# Patient Record
Sex: MALE | Race: OTHER | HISPANIC OR LATINO | URBAN - METROPOLITAN AREA
[De-identification: names, ages, dates, MRNs, and addresses within clinical notes are randomized per-mention and may not be internally consistent; named-entity substitution may affect disease eponyms.]

---

## 2020-05-20 ENCOUNTER — EMERGENCY (EMERGENCY)
Facility: HOSPITAL | Age: 70
LOS: 1 days | Discharge: HOME | End: 2020-05-20
Attending: EMERGENCY MEDICINE
Payer: COMMERCIAL

## 2020-05-20 VITALS
HEIGHT: 71 IN | TEMPERATURE: 99 F | DIASTOLIC BLOOD PRESSURE: 88 MMHG | HEART RATE: 88 BPM | SYSTOLIC BLOOD PRESSURE: 147 MMHG | WEIGHT: 160.06 LBS | OXYGEN SATURATION: 98 % | RESPIRATION RATE: 18 BRPM

## 2020-05-20 VITALS
OXYGEN SATURATION: 100 % | HEART RATE: 69 BPM | TEMPERATURE: 98 F | DIASTOLIC BLOOD PRESSURE: 44 MMHG | SYSTOLIC BLOOD PRESSURE: 159 MMHG | RESPIRATION RATE: 18 BRPM

## 2020-05-20 LAB
ALBUMIN SERPL ELPH-MCNC: 4.1 G/DL — SIGNIFICANT CHANGE UP (ref 3.5–5)
ALP SERPL-CCNC: 84 U/L — SIGNIFICANT CHANGE UP (ref 40–120)
ALT FLD-CCNC: 23 U/L DA — SIGNIFICANT CHANGE UP (ref 10–60)
ANION GAP SERPL CALC-SCNC: 8 MMOL/L — SIGNIFICANT CHANGE UP (ref 5–17)
APPEARANCE UR: CLEAR — SIGNIFICANT CHANGE UP
AST SERPL-CCNC: 25 U/L — SIGNIFICANT CHANGE UP (ref 10–40)
BASOPHILS # BLD AUTO: 0.04 K/UL — SIGNIFICANT CHANGE UP (ref 0–0.2)
BASOPHILS NFR BLD AUTO: 0.4 % — SIGNIFICANT CHANGE UP (ref 0–2)
BILIRUB SERPL-MCNC: 0.6 MG/DL — SIGNIFICANT CHANGE UP (ref 0.2–1.2)
BILIRUB UR-MCNC: NEGATIVE — SIGNIFICANT CHANGE UP
BUN SERPL-MCNC: 17 MG/DL — SIGNIFICANT CHANGE UP (ref 7–18)
CALCIUM SERPL-MCNC: 9.1 MG/DL — SIGNIFICANT CHANGE UP (ref 8.4–10.5)
CHLORIDE SERPL-SCNC: 104 MMOL/L — SIGNIFICANT CHANGE UP (ref 96–108)
CO2 SERPL-SCNC: 28 MMOL/L — SIGNIFICANT CHANGE UP (ref 22–31)
COLOR SPEC: YELLOW — SIGNIFICANT CHANGE UP
CREAT SERPL-MCNC: 1.32 MG/DL — HIGH (ref 0.5–1.3)
DIFF PNL FLD: ABNORMAL
EOSINOPHIL # BLD AUTO: 0.06 K/UL — SIGNIFICANT CHANGE UP (ref 0–0.5)
EOSINOPHIL NFR BLD AUTO: 0.6 % — SIGNIFICANT CHANGE UP (ref 0–6)
GLUCOSE SERPL-MCNC: 106 MG/DL — HIGH (ref 70–99)
GLUCOSE UR QL: NEGATIVE — SIGNIFICANT CHANGE UP
HCT VFR BLD CALC: 44.7 % — SIGNIFICANT CHANGE UP (ref 39–50)
HGB BLD-MCNC: 14.8 G/DL — SIGNIFICANT CHANGE UP (ref 13–17)
IMM GRANULOCYTES NFR BLD AUTO: 0.3 % — SIGNIFICANT CHANGE UP (ref 0–1.5)
KETONES UR-MCNC: NEGATIVE — SIGNIFICANT CHANGE UP
LEUKOCYTE ESTERASE UR-ACNC: NEGATIVE — SIGNIFICANT CHANGE UP
LIDOCAIN IGE QN: 163 U/L — SIGNIFICANT CHANGE UP (ref 73–393)
LYMPHOCYTES # BLD AUTO: 1.11 K/UL — SIGNIFICANT CHANGE UP (ref 1–3.3)
LYMPHOCYTES # BLD AUTO: 11 % — LOW (ref 13–44)
MCHC RBC-ENTMCNC: 31.9 PG — SIGNIFICANT CHANGE UP (ref 27–34)
MCHC RBC-ENTMCNC: 33.1 GM/DL — SIGNIFICANT CHANGE UP (ref 32–36)
MCV RBC AUTO: 96.3 FL — SIGNIFICANT CHANGE UP (ref 80–100)
MONOCYTES # BLD AUTO: 0.78 K/UL — SIGNIFICANT CHANGE UP (ref 0–0.9)
MONOCYTES NFR BLD AUTO: 7.7 % — SIGNIFICANT CHANGE UP (ref 2–14)
NEUTROPHILS # BLD AUTO: 8.09 K/UL — HIGH (ref 1.8–7.4)
NEUTROPHILS NFR BLD AUTO: 80 % — HIGH (ref 43–77)
NITRITE UR-MCNC: NEGATIVE — SIGNIFICANT CHANGE UP
NRBC # BLD: 0 /100 WBCS — SIGNIFICANT CHANGE UP (ref 0–0)
PH UR: 6 — SIGNIFICANT CHANGE UP (ref 5–8)
PLATELET # BLD AUTO: 244 K/UL — SIGNIFICANT CHANGE UP (ref 150–400)
POTASSIUM SERPL-MCNC: 4.1 MMOL/L — SIGNIFICANT CHANGE UP (ref 3.5–5.3)
POTASSIUM SERPL-SCNC: 4.1 MMOL/L — SIGNIFICANT CHANGE UP (ref 3.5–5.3)
PROT SERPL-MCNC: 7.6 G/DL — SIGNIFICANT CHANGE UP (ref 6–8.3)
PROT UR-MCNC: 15
RBC # BLD: 4.64 M/UL — SIGNIFICANT CHANGE UP (ref 4.2–5.8)
RBC # FLD: 13.3 % — SIGNIFICANT CHANGE UP (ref 10.3–14.5)
SODIUM SERPL-SCNC: 140 MMOL/L — SIGNIFICANT CHANGE UP (ref 135–145)
SP GR SPEC: 1.02 — SIGNIFICANT CHANGE UP (ref 1.01–1.02)
TROPONIN I SERPL-MCNC: <0.015 NG/ML — SIGNIFICANT CHANGE UP (ref 0–0.04)
UROBILINOGEN FLD QL: NEGATIVE — SIGNIFICANT CHANGE UP
WBC # BLD: 10.11 K/UL — SIGNIFICANT CHANGE UP (ref 3.8–10.5)
WBC # FLD AUTO: 10.11 K/UL — SIGNIFICANT CHANGE UP (ref 3.8–10.5)

## 2020-05-20 PROCEDURE — 85027 COMPLETE CBC AUTOMATED: CPT

## 2020-05-20 PROCEDURE — 99285 EMERGENCY DEPT VISIT HI MDM: CPT | Mod: 25

## 2020-05-20 PROCEDURE — 81001 URINALYSIS AUTO W/SCOPE: CPT

## 2020-05-20 PROCEDURE — 80053 COMPREHEN METABOLIC PANEL: CPT

## 2020-05-20 PROCEDURE — 96374 THER/PROPH/DIAG INJ IV PUSH: CPT | Mod: XU

## 2020-05-20 PROCEDURE — 83690 ASSAY OF LIPASE: CPT

## 2020-05-20 PROCEDURE — 87086 URINE CULTURE/COLONY COUNT: CPT

## 2020-05-20 PROCEDURE — 74177 CT ABD & PELVIS W/CONTRAST: CPT

## 2020-05-20 PROCEDURE — 84484 ASSAY OF TROPONIN QUANT: CPT

## 2020-05-20 PROCEDURE — 74177 CT ABD & PELVIS W/CONTRAST: CPT | Mod: 26

## 2020-05-20 PROCEDURE — 99285 EMERGENCY DEPT VISIT HI MDM: CPT

## 2020-05-20 RX ORDER — SODIUM CHLORIDE 9 MG/ML
1000 INJECTION INTRAMUSCULAR; INTRAVENOUS; SUBCUTANEOUS ONCE
Refills: 0 | Status: COMPLETED | OUTPATIENT
Start: 2020-05-20 | End: 2020-05-20

## 2020-05-20 RX ORDER — TAMSULOSIN HYDROCHLORIDE 0.4 MG/1
1 CAPSULE ORAL
Qty: 0 | Refills: 0 | DISCHARGE
Start: 2020-05-20 | End: 2020-06-02

## 2020-05-20 RX ORDER — IBUPROFEN 200 MG
1 TABLET ORAL
Qty: 30 | Refills: 0
Start: 2020-05-20

## 2020-05-20 RX ORDER — KETOROLAC TROMETHAMINE 30 MG/ML
30 SYRINGE (ML) INJECTION ONCE
Refills: 0 | Status: DISCONTINUED | OUTPATIENT
Start: 2020-05-20 | End: 2020-05-20

## 2020-05-20 RX ORDER — CEPHALEXIN 500 MG
1 CAPSULE ORAL
Qty: 14 | Refills: 0
Start: 2020-05-20 | End: 2020-05-26

## 2020-05-20 RX ORDER — TAMSULOSIN HYDROCHLORIDE 0.4 MG/1
1 CAPSULE ORAL
Qty: 14 | Refills: 0
Start: 2020-05-20 | End: 2020-06-02

## 2020-05-20 RX ADMIN — SODIUM CHLORIDE 1000 MILLILITER(S): 9 INJECTION INTRAMUSCULAR; INTRAVENOUS; SUBCUTANEOUS at 09:19

## 2020-05-20 RX ADMIN — SODIUM CHLORIDE 1000 MILLILITER(S): 9 INJECTION INTRAMUSCULAR; INTRAVENOUS; SUBCUTANEOUS at 12:27

## 2020-05-20 RX ADMIN — Medication 30 MILLIGRAM(S): at 09:18

## 2020-05-20 NOTE — ED ADULT TRIAGE NOTE - CHIEF COMPLAINT QUOTE
c/o right flank pain with nausea since yesterday denies any problems urination but does have prostate issue

## 2020-05-20 NOTE — CONSULT NOTE ADULT - NEGATIVE GENERAL GENITOURINARY SYMPTOMS
no incontinence/no urinary hesitancy/no hematuria/no urine discoloration/no bladder infections/normal urinary frequency/no dysuria/no gas in urine

## 2020-05-20 NOTE — ED PROVIDER NOTE - OBJECTIVE STATEMENT
Patient presenting with 2 day history of R sided abd pain. Pain is primarily R flank. States the pain is steady. Denies sharpness of pain. Patient reports some radiation of pain to front of abd with involvement of LLQ. Denies vomiting, but states he had some nausea this morning which is now resolved. Denies dysuria, urinary frequency, hematuria or other complaints. Denies fevers, chills, sweats, chest pain, no meds PTA. No exacerbating or relieving factors.

## 2020-05-20 NOTE — ED PROVIDER NOTE - PROGRESS NOTE DETAILS
9mm stone noted on CT. Patient assessed by Urology. Will dc with flomax, analgesia, and abx. Patient given follow up information for Dr. Plascencia to follow up.

## 2020-05-20 NOTE — ED PROVIDER NOTE - PATIENT PORTAL LINK FT
You can access the FollowMyHealth Patient Portal offered by [facility] by registering at the following website: [email or web address]. By joining [email or web address]’s FollowMyHealth portal, you will also be able to view your health information using other applications (apps) compatible with our system.

## 2020-05-20 NOTE — ED ADULT NURSE NOTE - OBJECTIVE STATEMENT
pt c/o R flank pain, R lower back pain since yesterday.  Denies dysuria.  Denies fevers, chills.  Abdomen soft, flat, nontender.

## 2020-05-20 NOTE — CONSULT NOTE ADULT - SUBJECTIVE AND OBJECTIVE BOX
Attending: Dr Plascencia      HPI:  68 y/o Male w/ PMHx of BPH, PSHx s/p appendectomy, presented to ED w/ c/o right flank and rt lower quadrant pain, pain 10/10 at the time of onset, currently much improved, pain present since yesterday, constant, pain a/w nausea and vomiting; pt denies fever, chills, SOB or CP, no dysuria or hematuria; Offers no other complaints.      PAST MEDICAL & SURGICAL HISTORY:  BPH  s/p appendectomy         Allergies  No Known Allergies  Intolerances      SOCIAL HISTORY:  unknown   FAMILY HISTORY:  Unknown     Vital Signs Last 24 Hrs  T(C): 36.8 (20 May 2020 11:24), Max: 37.2 (20 May 2020 08:48)  T(F): 98.3 (20 May 2020 11:24), Max: 99 (20 May 2020 08:48)  HR: 69 (20 May 2020 11:24) (69 - 88)  BP: 159/44 (20 May 2020 11:24) (147/88 - 159/44)  BP(mean): --  RR: 18 (20 May 2020 11:24) (18 - 18)  SpO2: 100% (20 May 2020 11:24) (98% - 100%)    I&O's Summary      LABS:                        14.8   10.11 )-----------( 244      ( 20 May 2020 09:34 )             44.7     05-20    140  |  104  |  17  ----------------------------<  106<H>  4.1   |  28  |  1.32<H>    Ca    9.1      20 May 2020 09:34    TPro  7.6  /  Alb  4.1  /  TBili  0.6  /  DBili  x   /  AST  25  /  ALT  23  /  AlkPhos  84  05-20      Urinalysis Basic - ( 20 May 2020 09:34 )    Color: Yellow / Appearance: Clear / S.020 / pH: x  Gluc: x / Ketone: Negative  / Bili: Negative / Urobili: Negative   Blood: x / Protein: 15 / Nitrite: Negative   Leuk Esterase: Negative / RBC: 10-25 /HPF / WBC 0-2 /HPF   Sq Epi: x / Non Sq Epi: Occasional /HPF / Bacteria: Trace /HPF      CAPILLARY BLOOD GLUCOSE    LIVER FUNCTIONS - ( 20 May 2020 09:34 )  Alb: 4.1 g/dL / Pro: 7.6 g/dL / ALK PHOS: 84 U/L / ALT: 23 U/L DA / AST: 25 U/L / GGT: x             RADIOLOGY & ADDITIONAL STUDIES:  < from: CT Abdomen and Pelvis w/ IV Cont (20 @ 10:54) >  FINDINGS:    LOWER CHEST: Within normal limits.    LIVER: Within normal limits.  BILE DUCTS: Normal caliber.  GALLBLADDER: Within normal limits.  SPLEEN: Within normal limits.  PANCREAS: Within normal limits.  ADRENALS: Within normal limits.  KIDNEYS/URETERS: 9 x 5 mm obstructing calculus of the mid rightureter resulting in delayed excretion from the right kidney and moderate right hydronephrosis. Stranding is demonstrated around the right kidney.     BLADDER: Within normal limits.  REPRODUCTIVE ORGANS: Moderate prostatic hypertrophy.    BOWEL: No bowel obstruction. Appendix is not visualized. No evidence of inflammation in the pericecal region.  PERITONEUM: No ascites.  VESSELS: Mild atheromatous changes of the abdominal aorta and iliac arteries.  RETROPERITONEUM/LYMPH NODES: No lymphadenopathy.   ABDOMINAL WALL: Within normal limits.  BONES: Moderate degenerative changes of the lower lumbar spine.    IMPRESSION:     Obstructing right mid ureteral calculus resulting in moderate right hydronephrosis.  Moderate prostatic hypertrophy.  No bowel related inflammation.  These findings were discussed with Dr. Chandler at the conclusion of today's evaluation.    < end of copied text >

## 2020-05-20 NOTE — CONSULT NOTE ADULT - ASSESSMENT
70 y/o Male w/ rt ureter 9 mm stone a/w right hydronephrosis, UTI, afebrile, wbc wnl, cr levels mildly elevated     -No acute urological intervention at this time  -Pain Medication PRN   -Abx   -Flomax  -Hydration/ increase PO fluid intake    -Urine cx   -Discussed with pt at length the diagnosis, need for outpatient follow-up with Dr Plascencia, and return to ED if symptoms worsen or if pt develops fevers.   -D/w Dr Plascencia and agrees

## 2020-05-21 LAB
CULTURE RESULTS: NO GROWTH — SIGNIFICANT CHANGE UP
SPECIMEN SOURCE: SIGNIFICANT CHANGE UP

## 2020-05-25 ENCOUNTER — TRANSCRIPTION ENCOUNTER (OUTPATIENT)
Age: 70
End: 2020-05-25

## 2020-05-26 ENCOUNTER — INPATIENT (INPATIENT)
Facility: HOSPITAL | Age: 70
LOS: 0 days | Discharge: HOME | DRG: 661 | End: 2020-05-27
Attending: UROLOGY | Admitting: UROLOGY
Payer: COMMERCIAL

## 2020-05-26 ENCOUNTER — TRANSCRIPTION ENCOUNTER (OUTPATIENT)
Age: 70
End: 2020-05-26

## 2020-05-26 VITALS
RESPIRATION RATE: 20 BRPM | WEIGHT: 160.06 LBS | HEIGHT: 71 IN | DIASTOLIC BLOOD PRESSURE: 65 MMHG | OXYGEN SATURATION: 100 % | TEMPERATURE: 98 F | SYSTOLIC BLOOD PRESSURE: 128 MMHG | HEART RATE: 80 BPM

## 2020-05-26 DIAGNOSIS — N23 UNSPECIFIED RENAL COLIC: ICD-10-CM

## 2020-05-26 DIAGNOSIS — Z90.49 ACQUIRED ABSENCE OF OTHER SPECIFIED PARTS OF DIGESTIVE TRACT: Chronic | ICD-10-CM

## 2020-05-26 LAB
ALBUMIN SERPL ELPH-MCNC: 3.7 G/DL — SIGNIFICANT CHANGE UP (ref 3.5–5)
ALP SERPL-CCNC: 78 U/L — SIGNIFICANT CHANGE UP (ref 40–120)
ALT FLD-CCNC: 25 U/L DA — SIGNIFICANT CHANGE UP (ref 10–60)
ANION GAP SERPL CALC-SCNC: 7 MMOL/L — SIGNIFICANT CHANGE UP (ref 5–17)
APPEARANCE UR: CLEAR — SIGNIFICANT CHANGE UP
APTT BLD: 37.8 SEC — HIGH (ref 27.5–36.3)
AST SERPL-CCNC: 23 U/L — SIGNIFICANT CHANGE UP (ref 10–40)
BACTERIA # UR AUTO: ABNORMAL /HPF
BASOPHILS # BLD AUTO: 0.07 K/UL — SIGNIFICANT CHANGE UP (ref 0–0.2)
BASOPHILS NFR BLD AUTO: 1.3 % — SIGNIFICANT CHANGE UP (ref 0–2)
BILIRUB SERPL-MCNC: 0.5 MG/DL — SIGNIFICANT CHANGE UP (ref 0.2–1.2)
BILIRUB UR-MCNC: NEGATIVE — SIGNIFICANT CHANGE UP
BUN SERPL-MCNC: 23 MG/DL — HIGH (ref 7–18)
CALCIUM SERPL-MCNC: 9.7 MG/DL — SIGNIFICANT CHANGE UP (ref 8.4–10.5)
CHLORIDE SERPL-SCNC: 105 MMOL/L — SIGNIFICANT CHANGE UP (ref 96–108)
CO2 SERPL-SCNC: 27 MMOL/L — SIGNIFICANT CHANGE UP (ref 22–31)
COLOR SPEC: YELLOW — SIGNIFICANT CHANGE UP
CREAT SERPL-MCNC: 1.35 MG/DL — HIGH (ref 0.5–1.3)
DIFF PNL FLD: ABNORMAL
EOSINOPHIL # BLD AUTO: 0.46 K/UL — SIGNIFICANT CHANGE UP (ref 0–0.5)
EOSINOPHIL NFR BLD AUTO: 8.9 % — HIGH (ref 0–6)
EPI CELLS # UR: ABNORMAL /HPF
GLUCOSE SERPL-MCNC: 84 MG/DL — SIGNIFICANT CHANGE UP (ref 70–99)
GLUCOSE UR QL: NEGATIVE — SIGNIFICANT CHANGE UP
HCT VFR BLD CALC: 42.4 % — SIGNIFICANT CHANGE UP (ref 39–50)
HGB BLD-MCNC: 14 G/DL — SIGNIFICANT CHANGE UP (ref 13–17)
IMM GRANULOCYTES NFR BLD AUTO: 0.2 % — SIGNIFICANT CHANGE UP (ref 0–1.5)
INR BLD: 1.03 RATIO — SIGNIFICANT CHANGE UP (ref 0.88–1.16)
KETONES UR-MCNC: NEGATIVE — SIGNIFICANT CHANGE UP
LEUKOCYTE ESTERASE UR-ACNC: ABNORMAL
LYMPHOCYTES # BLD AUTO: 1.83 K/UL — SIGNIFICANT CHANGE UP (ref 1–3.3)
LYMPHOCYTES # BLD AUTO: 35.3 % — SIGNIFICANT CHANGE UP (ref 13–44)
MCHC RBC-ENTMCNC: 31.5 PG — SIGNIFICANT CHANGE UP (ref 27–34)
MCHC RBC-ENTMCNC: 33 GM/DL — SIGNIFICANT CHANGE UP (ref 32–36)
MCV RBC AUTO: 95.3 FL — SIGNIFICANT CHANGE UP (ref 80–100)
MONOCYTES # BLD AUTO: 0.59 K/UL — SIGNIFICANT CHANGE UP (ref 0–0.9)
MONOCYTES NFR BLD AUTO: 11.4 % — SIGNIFICANT CHANGE UP (ref 2–14)
NEUTROPHILS # BLD AUTO: 2.23 K/UL — SIGNIFICANT CHANGE UP (ref 1.8–7.4)
NEUTROPHILS NFR BLD AUTO: 42.9 % — LOW (ref 43–77)
NITRITE UR-MCNC: NEGATIVE — SIGNIFICANT CHANGE UP
NRBC # BLD: 0 /100 WBCS — SIGNIFICANT CHANGE UP (ref 0–0)
PH UR: 6.5 — SIGNIFICANT CHANGE UP (ref 5–8)
PLATELET # BLD AUTO: 252 K/UL — SIGNIFICANT CHANGE UP (ref 150–400)
POTASSIUM SERPL-MCNC: 3.7 MMOL/L — SIGNIFICANT CHANGE UP (ref 3.5–5.3)
POTASSIUM SERPL-SCNC: 3.7 MMOL/L — SIGNIFICANT CHANGE UP (ref 3.5–5.3)
PROT SERPL-MCNC: 7.4 G/DL — SIGNIFICANT CHANGE UP (ref 6–8.3)
PROT UR-MCNC: NEGATIVE — SIGNIFICANT CHANGE UP
PROTHROM AB SERPL-ACNC: 11.6 SEC — SIGNIFICANT CHANGE UP (ref 10–12.9)
RBC # BLD: 4.45 M/UL — SIGNIFICANT CHANGE UP (ref 4.2–5.8)
RBC # FLD: 13.2 % — SIGNIFICANT CHANGE UP (ref 10.3–14.5)
RBC CASTS # UR COMP ASSIST: ABNORMAL /HPF (ref 0–2)
SARS-COV-2 RNA SPEC QL NAA+PROBE: SIGNIFICANT CHANGE UP
SODIUM SERPL-SCNC: 139 MMOL/L — SIGNIFICANT CHANGE UP (ref 135–145)
SP GR SPEC: 1.01 — SIGNIFICANT CHANGE UP (ref 1.01–1.02)
UROBILINOGEN FLD QL: NEGATIVE — SIGNIFICANT CHANGE UP
WBC # BLD: 5.19 K/UL — SIGNIFICANT CHANGE UP (ref 3.8–10.5)
WBC # FLD AUTO: 5.19 K/UL — SIGNIFICANT CHANGE UP (ref 3.8–10.5)
WBC UR QL: ABNORMAL /HPF (ref 0–5)

## 2020-05-26 PROCEDURE — 74420 UROGRAPHY RTRGR +-KUB: CPT | Mod: 26

## 2020-05-26 PROCEDURE — 99285 EMERGENCY DEPT VISIT HI MDM: CPT

## 2020-05-26 PROCEDURE — 99223 1ST HOSP IP/OBS HIGH 75: CPT | Mod: 25

## 2020-05-26 PROCEDURE — 52332 CYSTOSCOPY AND TREATMENT: CPT | Mod: RT

## 2020-05-26 RX ORDER — ZOLPIDEM TARTRATE 10 MG/1
5 TABLET ORAL ONCE
Refills: 0 | Status: DISCONTINUED | OUTPATIENT
Start: 2020-05-26 | End: 2020-05-26

## 2020-05-26 RX ORDER — CEFOTETAN DISODIUM 1 G
1 VIAL (EA) INJECTION ONCE
Refills: 0 | Status: COMPLETED | OUTPATIENT
Start: 2020-05-26 | End: 2020-05-26

## 2020-05-26 RX ORDER — ONDANSETRON 8 MG/1
4 TABLET, FILM COATED ORAL EVERY 6 HOURS
Refills: 0 | Status: DISCONTINUED | OUTPATIENT
Start: 2020-05-26 | End: 2020-05-26

## 2020-05-26 RX ORDER — ONDANSETRON 8 MG/1
4 TABLET, FILM COATED ORAL EVERY 6 HOURS
Refills: 0 | Status: DISCONTINUED | OUTPATIENT
Start: 2020-05-26 | End: 2020-05-27

## 2020-05-26 RX ORDER — MORPHINE SULFATE 50 MG/1
2 CAPSULE, EXTENDED RELEASE ORAL EVERY 4 HOURS
Refills: 0 | Status: DISCONTINUED | OUTPATIENT
Start: 2020-05-26 | End: 2020-05-26

## 2020-05-26 RX ORDER — ONDANSETRON 8 MG/1
4 TABLET, FILM COATED ORAL ONCE
Refills: 0 | Status: COMPLETED | OUTPATIENT
Start: 2020-05-26 | End: 2020-05-26

## 2020-05-26 RX ORDER — ACETAMINOPHEN 500 MG
650 TABLET ORAL EVERY 6 HOURS
Refills: 0 | Status: DISCONTINUED | OUTPATIENT
Start: 2020-05-26 | End: 2020-05-27

## 2020-05-26 RX ORDER — SODIUM CHLORIDE 9 MG/ML
1000 INJECTION INTRAMUSCULAR; INTRAVENOUS; SUBCUTANEOUS ONCE
Refills: 0 | Status: COMPLETED | OUTPATIENT
Start: 2020-05-26 | End: 2020-05-26

## 2020-05-26 RX ORDER — CEFOTETAN DISODIUM 1 G
VIAL (EA) INJECTION
Refills: 0 | Status: DISCONTINUED | OUTPATIENT
Start: 2020-05-26 | End: 2020-05-27

## 2020-05-26 RX ORDER — CEFOTETAN DISODIUM 1 G
1 VIAL (EA) INJECTION EVERY 12 HOURS
Refills: 0 | Status: DISCONTINUED | OUTPATIENT
Start: 2020-05-27 | End: 2020-05-27

## 2020-05-26 RX ORDER — CIPROFLOXACIN LACTATE 400MG/40ML
400 VIAL (ML) INTRAVENOUS ONCE
Refills: 0 | Status: COMPLETED | OUTPATIENT
Start: 2020-05-26 | End: 2020-05-26

## 2020-05-26 RX ORDER — MORPHINE SULFATE 50 MG/1
2 CAPSULE, EXTENDED RELEASE ORAL EVERY 4 HOURS
Refills: 0 | Status: DISCONTINUED | OUTPATIENT
Start: 2020-05-26 | End: 2020-05-27

## 2020-05-26 RX ORDER — ACETAMINOPHEN 500 MG
650 TABLET ORAL EVERY 6 HOURS
Refills: 0 | Status: DISCONTINUED | OUTPATIENT
Start: 2020-05-26 | End: 2020-05-26

## 2020-05-26 RX ORDER — TAMSULOSIN HYDROCHLORIDE 0.4 MG/1
0.4 CAPSULE ORAL AT BEDTIME
Refills: 0 | Status: DISCONTINUED | OUTPATIENT
Start: 2020-05-26 | End: 2020-05-27

## 2020-05-26 RX ORDER — SODIUM CHLORIDE 9 MG/ML
1000 INJECTION, SOLUTION INTRAVENOUS
Refills: 0 | Status: DISCONTINUED | OUTPATIENT
Start: 2020-05-26 | End: 2020-05-26

## 2020-05-26 RX ORDER — MORPHINE SULFATE 50 MG/1
4 CAPSULE, EXTENDED RELEASE ORAL ONCE
Refills: 0 | Status: DISCONTINUED | OUTPATIENT
Start: 2020-05-26 | End: 2020-05-26

## 2020-05-26 RX ORDER — TAMSULOSIN HYDROCHLORIDE 0.4 MG/1
0.4 CAPSULE ORAL AT BEDTIME
Refills: 0 | Status: DISCONTINUED | OUTPATIENT
Start: 2020-05-26 | End: 2020-05-26

## 2020-05-26 RX ADMIN — ONDANSETRON 4 MILLIGRAM(S): 8 TABLET, FILM COATED ORAL at 11:15

## 2020-05-26 RX ADMIN — TAMSULOSIN HYDROCHLORIDE 0.4 MILLIGRAM(S): 0.4 CAPSULE ORAL at 21:10

## 2020-05-26 RX ADMIN — SODIUM CHLORIDE 1000 MILLILITER(S): 9 INJECTION INTRAMUSCULAR; INTRAVENOUS; SUBCUTANEOUS at 11:16

## 2020-05-26 RX ADMIN — SODIUM CHLORIDE 1000 MILLILITER(S): 9 INJECTION INTRAMUSCULAR; INTRAVENOUS; SUBCUTANEOUS at 11:17

## 2020-05-26 RX ADMIN — Medication 100 GRAM(S): at 21:09

## 2020-05-26 RX ADMIN — MORPHINE SULFATE 4 MILLIGRAM(S): 50 CAPSULE, EXTENDED RELEASE ORAL at 11:15

## 2020-05-26 RX ADMIN — MORPHINE SULFATE 2 MILLIGRAM(S): 50 CAPSULE, EXTENDED RELEASE ORAL at 23:20

## 2020-05-26 RX ADMIN — ZOLPIDEM TARTRATE 5 MILLIGRAM(S): 10 TABLET ORAL at 22:07

## 2020-05-26 NOTE — H&P ADULT - ASSESSMENT
69M with PMHx, s/p appendectomy presents with R mid ureteral calculus. Admit to surgery for ureteral stent placement    -NPO  -IVF  -continue abx  -continue flomax  -pre-op labs  -COVID pending  -consent to be obtain  -Pain control, anti-emetics  -Discussed with Dr. Polo 69M with PMHx, s/p appendectomy presents with R mid ureteral calculus. Admit to urology for ureteral stent placement  -CT images reviewed  -labs reviewed  -Discussed with PA staff  -NPO  -IVF  -continue abx  -continue flomax  -pre-op labs  -COVID pending  -consent to be obtained  -Pain control, anti-emetics  -Discussed with Dr. Polo

## 2020-05-26 NOTE — DISCHARGE NOTE PROVIDER - HOSPITAL COURSE
69M with PMHx of BPH, s/p appendectomy presents complaining of right sided flank pain. Patient states he came to the hospital on 5/20 with similar pain and was found to have a 9mm right ureteral stone on CT, states he was given antibiotics and pain medication which initially helped but reported 10/10 pain, associated with nausea and vomiting upon arrival to the ED. Denies fever or chills. Patient underwent a right ureteral stent placement in the operating room with Dr Polo. Patient was stable for discharge with outpatient follow-up. 69M with PMHx of BPH, s/p appendectomy presents complaining of right sided flank pain. Patient states he came to the hospital on 5/20 with similar pain and was found to have a 9mm right ureteral stone on CT, states he was given antibiotics and pain medication which initially helped but reported 10/10 pain, associated with nausea and vomiting upon arrival to the ED. Denies fever or chills. Patient underwent a right ureteral stent placement with dilation of urethral stricture incidentally noted at time of cystoscopy.. Patient stable for discharge with hall and leg bag and po abx.

## 2020-05-26 NOTE — H&P ADULT - NSHPLABSRESULTS_GEN_ALL_CORE
< from: CT Abdomen and Pelvis w/ IV Cont (05.20.20 @ 10:54) >    IMPRESSION:     Obstructing right mid ureteral calculus resulting in moderate right hydronephrosis.  Moderate prostatic hypertrophy.  No bowel related inflammation.  These findings were discussed with Dr. Chandler at the conclusion of today's evaluation.    < end of copied text >

## 2020-05-26 NOTE — ED PROVIDER NOTE - CLINICAL SUMMARY MEDICAL DECISION MAKING FREE TEXT BOX
68 yo male with known 9mm kidney stone presenting to ED with complaints of increasing pain. Based on exam and history obstructing stone, will repeat labs, pain control and fluids, call urology for consult.

## 2020-05-26 NOTE — DISCHARGE NOTE PROVIDER - NSDCCPCAREPLAN_GEN_ALL_CORE_FT
PRINCIPAL DISCHARGE DIAGNOSIS  Diagnosis: Renal colic  Assessment and Plan of Treatment: Please follow-up with Dr. Polo in the office as directed. You may take over-the-counter medication for your pain as needed. PRINCIPAL DISCHARGE DIAGNOSIS  Diagnosis: Renal colic  Assessment and Plan of Treatment: Please follow-up with Dr. Polo in the office as directed. You may take over-the-counter medication for your pain as needed.      SECONDARY DISCHARGE DIAGNOSES  Diagnosis: Urethral stricture  Assessment and Plan of Treatment:

## 2020-05-26 NOTE — DISCHARGE NOTE PROVIDER - CARE PROVIDER_API CALL
Jerry Polo J  UROLOGY  [address] [address] [place] [address]  Phone: [phone]  Fax: [phone]  Follow Up Time:

## 2020-05-26 NOTE — DISCHARGE NOTE PROVIDER - NSDCCPTREATMENT_GEN_ALL_CORE_FT
PRINCIPAL PROCEDURE  Procedure: Cystoscopy with right retrograde pyelography with ureteroscopy with insertion of ureteral stent  Findings and Treatment: PRINCIPAL PROCEDURE  Procedure: Cystoscopy with right retrograde pyelography with ureteroscopy with insertion of ureteral stent  Findings and Treatment:       SECONDARY PROCEDURE  Procedure: Cystoscopy with urethral dilation  Findings and Treatment:

## 2020-05-26 NOTE — ED PROVIDER NOTE - OBJECTIVE STATEMENT
68 yo male with no PMHx presenting to ED with complaints of right flank pain with radiation to the left. Patient was diagnosed with a 9mm stone two days prior and sent home with meds and FU with urology. Patient states pain is getting worse and now radiating to the left. Endorses 10/10 pain, n/v. Denies fever, shaking chills or urinary retention. Patient has finished his pain medication at home.

## 2020-05-26 NOTE — BRIEF OPERATIVE NOTE - NSICDXBRIEFPOSTOP_GEN_ALL_CORE_FT
POST-OP DIAGNOSIS:  Urethral stricture 26-May-2020 18:41:56  Gabriel Garces  Right ureteral stone 26-May-2020 18:41:12  Gabriel Garces

## 2020-05-26 NOTE — H&P ADULT - HISTORY OF PRESENT ILLNESS
68 y/o M with PMHx of BPH, s/p appendectomy presents complaining of right sided flank pain. Patient states he came to the hospital on 5/20 complaining of the same pain. He was found to have a 9mm right ureteral stone on CT. He states he was given antibiotics and pain medication which initially was helping with him symptoms. Currently he reports 10/10 pain, which is momentarily relieved with Percocet. He also reports nausea and vomiting associated with the pain. Denies fever or chills. 70 y/o M with PMHx of BPH, s/p appendectomy presents complaining of right sided flank pain. Patient states he came to the hospital on 5/20 complaining of the same pain. He was found to have a 9mm right ureteral stone on CT. He states he was given antibiotics and pain medication which initially was helping with him symptoms. Currently he reports 10/10 pain, which is momentarily relieved with Percocet. He also reports nausea and vomiting associated with the pain. Denies fever or chills. No specific timing to his symptoms. No aggravating factors but pain medication helps the pain.

## 2020-05-26 NOTE — ED PROVIDER NOTE - ATTENDING CONTRIBUTION TO CARE
68 yo male with intractable rt flank pain, n/v  known 9x5 mm rt mid ureteral stone   finished percocet rx, was taking q4h  rt flank ttp  consult urology for possible admission for stent placement

## 2020-05-26 NOTE — DISCHARGE NOTE PROVIDER - NSDCMRMEDTOKEN_GEN_ALL_CORE_FT
Flomax 0.4 mg oral capsule: 1 cap(s) orally once a day   ibuprofen 800 mg oral tablet: 1 tab(s) orally every 8 hours, As Needed for pain.  Keflex 500 mg oral capsule: 1 cap(s) orally 2 times a day   oxycodone-acetaminophen 5 mg-300 mg oral tablet: 1 tab(s) orally every 6 hours MDD:4 tabs  oxycodone-acetaminophen 5 mg-325 mg oral tablet: 1 tab(s) orally every 6 hours, As Needed for pain. MDD:3 tabs Bactrim  mg-160 mg oral tablet: 1 tab(s) orally every 12 hours MDD:2  Flomax 0.4 mg oral capsule: 1 cap(s) orally once a day   oxycodone-acetaminophen 5 mg-300 mg oral tablet: 1 tab(s) orally every 6 hours MDD:4 tabs  oxycodone-acetaminophen 5 mg-325 mg oral tablet: 1 tab(s) orally every 6 hours, As Needed for pain. MDD:3 tabs

## 2020-05-26 NOTE — BRIEF OPERATIVE NOTE - NSICDXBRIEFPROCEDURE_GEN_ALL_CORE_FT
PROCEDURES:  Cystourethroscopy with dilation of urethral structure and cystography 26-May-2020 18:40:26  Gabriel Garces  Cystoureteroscopy, with retrograde pyelogram and ureteral stent insertion 26-May-2020 18:39:56  Gabriel Garces

## 2020-05-26 NOTE — ED ADULT NURSE NOTE - NSIMPLEMENTINTERV_GEN_ALL_ED
Implemented All Universal Safety Interventions:  [place] to call system. Call bell, personal items and telephone within reach. Instruct patient to call for assistance. Room bathroom lighting operational. Non-slip footwear when patient is off stretcher. Physically safe environment: no spills, clutter or unnecessary equipment. Stretcher in lowest position, wheels locked, appropriate side rails in place.

## 2020-05-27 ENCOUNTER — TRANSCRIPTION ENCOUNTER (OUTPATIENT)
Age: 70
End: 2020-05-27

## 2020-05-27 VITALS
HEART RATE: 75 BPM | SYSTOLIC BLOOD PRESSURE: 109 MMHG | TEMPERATURE: 99 F | DIASTOLIC BLOOD PRESSURE: 72 MMHG | OXYGEN SATURATION: 99 % | RESPIRATION RATE: 18 BRPM

## 2020-05-27 LAB
ANION GAP SERPL CALC-SCNC: 9 MMOL/L — SIGNIFICANT CHANGE UP (ref 5–17)
BUN SERPL-MCNC: 21 MG/DL — HIGH (ref 7–18)
CALCIUM SERPL-MCNC: 8.9 MG/DL — SIGNIFICANT CHANGE UP (ref 8.4–10.5)
CHLORIDE SERPL-SCNC: 104 MMOL/L — SIGNIFICANT CHANGE UP (ref 96–108)
CO2 SERPL-SCNC: 25 MMOL/L — SIGNIFICANT CHANGE UP (ref 22–31)
CREAT SERPL-MCNC: 1.49 MG/DL — HIGH (ref 0.5–1.3)
CULTURE RESULTS: NO GROWTH — SIGNIFICANT CHANGE UP
GLUCOSE SERPL-MCNC: 113 MG/DL — HIGH (ref 70–99)
HCT VFR BLD CALC: 41.1 % — SIGNIFICANT CHANGE UP (ref 39–50)
HCV AB S/CO SERPL IA: 0.23 S/CO — SIGNIFICANT CHANGE UP (ref 0–0.99)
HCV AB SERPL-IMP: SIGNIFICANT CHANGE UP
HGB BLD-MCNC: 13.8 G/DL — SIGNIFICANT CHANGE UP (ref 13–17)
MCHC RBC-ENTMCNC: 32 PG — SIGNIFICANT CHANGE UP (ref 27–34)
MCHC RBC-ENTMCNC: 33.6 GM/DL — SIGNIFICANT CHANGE UP (ref 32–36)
MCV RBC AUTO: 95.4 FL — SIGNIFICANT CHANGE UP (ref 80–100)
NRBC # BLD: 0 /100 WBCS — SIGNIFICANT CHANGE UP (ref 0–0)
PLATELET # BLD AUTO: 265 K/UL — SIGNIFICANT CHANGE UP (ref 150–400)
POTASSIUM SERPL-MCNC: 4.2 MMOL/L — SIGNIFICANT CHANGE UP (ref 3.5–5.3)
POTASSIUM SERPL-SCNC: 4.2 MMOL/L — SIGNIFICANT CHANGE UP (ref 3.5–5.3)
RBC # BLD: 4.31 M/UL — SIGNIFICANT CHANGE UP (ref 4.2–5.8)
RBC # FLD: 13.2 % — SIGNIFICANT CHANGE UP (ref 10.3–14.5)
SODIUM SERPL-SCNC: 138 MMOL/L — SIGNIFICANT CHANGE UP (ref 135–145)
SPECIMEN SOURCE: SIGNIFICANT CHANGE UP
WBC # BLD: 5.06 K/UL — SIGNIFICANT CHANGE UP (ref 3.8–10.5)
WBC # FLD AUTO: 5.06 K/UL — SIGNIFICANT CHANGE UP (ref 3.8–10.5)

## 2020-05-27 PROCEDURE — 87635 SARS-COV-2 COVID-19 AMP PRB: CPT

## 2020-05-27 PROCEDURE — 86803 HEPATITIS C AB TEST: CPT

## 2020-05-27 PROCEDURE — 85027 COMPLETE CBC AUTOMATED: CPT

## 2020-05-27 PROCEDURE — 85610 PROTHROMBIN TIME: CPT

## 2020-05-27 PROCEDURE — 86901 BLOOD TYPING SEROLOGIC RH(D): CPT

## 2020-05-27 PROCEDURE — C1769: CPT

## 2020-05-27 PROCEDURE — 87086 URINE CULTURE/COLONY COUNT: CPT

## 2020-05-27 PROCEDURE — 36415 COLL VENOUS BLD VENIPUNCTURE: CPT

## 2020-05-27 PROCEDURE — 76000 FLUOROSCOPY <1 HR PHYS/QHP: CPT

## 2020-05-27 PROCEDURE — 86900 BLOOD TYPING SEROLOGIC ABO: CPT

## 2020-05-27 PROCEDURE — 99238 HOSP IP/OBS DSCHRG MGMT 30/<: CPT

## 2020-05-27 PROCEDURE — C2617: CPT

## 2020-05-27 PROCEDURE — 80053 COMPREHEN METABOLIC PANEL: CPT

## 2020-05-27 PROCEDURE — 86850 RBC ANTIBODY SCREEN: CPT

## 2020-05-27 PROCEDURE — 81001 URINALYSIS AUTO W/SCOPE: CPT

## 2020-05-27 PROCEDURE — 85730 THROMBOPLASTIN TIME PARTIAL: CPT

## 2020-05-27 PROCEDURE — 99285 EMERGENCY DEPT VISIT HI MDM: CPT | Mod: 25

## 2020-05-27 PROCEDURE — C1758: CPT

## 2020-05-27 PROCEDURE — 80048 BASIC METABOLIC PNL TOTAL CA: CPT

## 2020-05-27 RX ORDER — AZTREONAM 2 G
1 VIAL (EA) INJECTION
Qty: 14 | Refills: 0
Start: 2020-05-27 | End: 2020-06-02

## 2020-05-27 RX ADMIN — Medication 100 GRAM(S): at 09:36

## 2020-05-27 NOTE — DISCHARGE NOTE NURSING/CASE MANAGEMENT/SOCIAL WORK - PATIENT PORTAL LINK FT
You can access the FollowMyHealth Patient Portal offered by [facility] by registering at the following website: [email or web address]. By joining [employer]’s FollowMyHealth portal, you will also be able to view your health information using other applications (apps) compatible with our system.

## 2020-05-27 NOTE — PROGRESS NOTE ADULT - ASSESSMENT
Very pleasant 69 year old gentleman with right ureteral stone, prostatic urethral stricture s/p stricture dilation, right ureteral stent placement POD #1  -No events overnight  -D/C home with Roger catheter to leg bag  -Bactrim x 7 days  - Follow up with Dr. Polo after discharge by calling [phone] or [phone] to schedule an appointment.   [address] [facility] [address]  [place] [address]

## 2020-05-28 PROBLEM — N40.0 BENIGN PROSTATIC HYPERPLASIA WITHOUT LOWER URINARY TRACT SYMPTOMS: Chronic | Status: ACTIVE | Noted: 2020-05-26

## 2020-05-29 ENCOUNTER — APPOINTMENT (OUTPATIENT)
Dept: UROLOGY | Facility: CLINIC | Age: 70
End: 2020-05-29
Payer: MEDICAID

## 2020-05-29 VITALS
TEMPERATURE: 98.3 F | SYSTOLIC BLOOD PRESSURE: 112 MMHG | HEIGHT: 71 IN | RESPIRATION RATE: 15 BRPM | DIASTOLIC BLOOD PRESSURE: 71 MMHG | WEIGHT: 151 LBS | BODY MASS INDEX: 21.14 KG/M2 | HEART RATE: 78 BPM

## 2020-05-29 DIAGNOSIS — Z78.9 OTHER SPECIFIED HEALTH STATUS: ICD-10-CM

## 2020-05-29 DIAGNOSIS — N35.919 UNSPECIFIED URETHRAL STRICTURE, MALE, UNSPECIFIED SITE: ICD-10-CM

## 2020-05-29 PROCEDURE — 99214 OFFICE O/P EST MOD 30 MIN: CPT

## 2020-05-29 NOTE — ASSESSMENT
[FreeTextEntry1] : Very pleasant 69-year-old gentleman who presents for followup from recent hospitalization of right ureteral stone status post stent, prostatic urethral stricture and BPH status post dilation\par -Continue Roger catheter\par -CT images reviewed with the patient\par -Prior labs reviewed\par -Urine culture negative for infection\par -I discussed the different treatment modalities for nephrolithiasis with the patient, including medical management, spontaneous stone passage, percutaneous stone extraction, extracorporeal shock wave lithotripsy, and ureteroscopy with laser lithotripsy and stone extraction. Given the size and location of the stone, I recommend and the patient opted to proceed with ureteroscopy.  The risks, benefits, and alternatives to ureteroscopy were discussed with the patient, including but not limited to pain, infection, bleeding, bladder injury, ureteral injury, renal injury, and treatment failure.  I also discussed the possible need for a temporary ureteral stent.  I discussed the possible side effects of a temporary ureteral stent, including flank pain, hematuria, and bladder spasms.  The patient understands that a stent is a temporary implant that must be removed in the future.  The patient wishes to proceed and we will schedule the surgery for the near future.\par -I discussed the different treatment modalities for benign prostatic hyperplasia (BPH) with the patient, including medical management, office procedures, Greenlight laser vaporization of the prostate, transurethral resection of the prostate (TURP), laser enucleation of the prostate, and open/robotic simple prostatectomy. Given the size of the gland and the patient's symptoms, I recommend and the patient opted to proceed with transurethral resection of the prostate.  The risks, benefits, and alternatives to transurethral resection of the prostate were discussed with the patient, including but not limited to abdominal and penile pain, infection, bleeding, hyponatremia, bladder injury, urethral injury including the formation of a urethral stricture, ureteral injury, incontinence, and treatment failure.  I also discussed the risk of retrograde ejaculation, anejaculation, infertility, and erectile dysfunction with the patient. We discussed the need for a temporary indwelling catheter following the procedure and the need to stay in the hospital post-operatively.  The patient wishes to proceed and we will schedule the surgery for the near future.

## 2020-05-29 NOTE — PHYSICAL EXAM
[General Appearance - Well Developed] : well developed [General Appearance - Well Nourished] : well nourished [Normal Appearance] : normal appearance [Well Groomed] : well groomed [Edema] : no peripheral edema [General Appearance - In No Acute Distress] : no acute distress [Respiration, Rhythm And Depth] : normal respiratory rhythm and effort [Exaggerated Use Of Accessory Muscles For Inspiration] : no accessory muscle use [Abdomen Soft] : soft [Abdomen Tenderness] : non-tender [Costovertebral Angle Tenderness] : no ~M costovertebral angle tenderness [Urethral Meatus] : meatus normal [Urinary Bladder Findings] : the bladder was normal on palpation [Scrotum] : the scrotum was normal [Testes Mass (___cm)] : there were no testicular masses [FreeTextEntry1] : hall with light red urine [Normal Station and Gait] : the gait and station were normal for the patient's age [] : no rash [No Focal Deficits] : no focal deficits [Oriented To Time, Place, And Person] : oriented to person, place, and time [Affect] : the affect was normal [Mood] : the mood was normal [Not Anxious] : not anxious [No Palpable Adenopathy] : no palpable adenopathy

## 2020-05-29 NOTE — HISTORY OF PRESENT ILLNESS
[FreeTextEntry1] : Very pleasant 69-year-old gentleman who presents for followup from recent hospitalization for right ureteral stone status post right ureteral stent, BPH with urinary traction, prostatic urethral stricture. Patient initially presented to the hospital complaining of right flank pain. A CT scan was done which demonstrated a 9 mm proximal to mid ureteral stone. He again presented to the hospital a few days later complaining of persistent flank pain. He was taken to the operating room for a right ureteral stent placement and at the time of the procedure was found to have a very narrow caliber prostatic urethral stricture. This was dilated at that time. Postoperatively he reports that he has had significant difficulty urinating for the last few years. He reports that he underwent a "mini-surgery" on his prostate 4-5 years ago at [facility] but has not been able to urinate well since then. He reports that he needs to strain and Valsalva to urinate normally. [Urinary Retention] : urinary retention [Urinary Urgency] : no urinary urgency [Nocturia] : nocturia [Urinary Frequency] : no urinary frequency [Straining] : straining [Weak Stream] : weak stream [Dysuria] : no dysuria [Bladder Spasm] : no bladder spasm [Hematuria - Gross] : gross hematuria [Abdominal Pain] : no abdominal pain [Flank Pain] : flank pain [Fever] : no fever [Fatigue] : no fatigue

## 2020-06-05 ENCOUNTER — OUTPATIENT (OUTPATIENT)
Dept: OUTPATIENT SERVICES | Facility: HOSPITAL | Age: 70
LOS: 1 days | End: 2020-06-05

## 2020-06-05 VITALS
TEMPERATURE: 98 F | HEIGHT: 71 IN | WEIGHT: 160.06 LBS | OXYGEN SATURATION: 100 % | SYSTOLIC BLOOD PRESSURE: 115 MMHG | HEART RATE: 86 BPM | DIASTOLIC BLOOD PRESSURE: 72 MMHG | RESPIRATION RATE: 16 BRPM

## 2020-06-05 DIAGNOSIS — Z90.49 ACQUIRED ABSENCE OF OTHER SPECIFIED PARTS OF DIGESTIVE TRACT: Chronic | ICD-10-CM

## 2020-06-05 DIAGNOSIS — Z90.79 ACQUIRED ABSENCE OF OTHER GENITAL ORGAN(S): Chronic | ICD-10-CM

## 2020-06-05 DIAGNOSIS — N40.1 BENIGN PROSTATIC HYPERPLASIA WITH LOWER URINARY TRACT SYMPTOMS: ICD-10-CM

## 2020-06-05 DIAGNOSIS — Z01.818 ENCOUNTER FOR OTHER PREPROCEDURAL EXAMINATION: ICD-10-CM

## 2020-06-05 DIAGNOSIS — N13.8 OTHER OBSTRUCTIVE AND REFLUX UROPATHY: ICD-10-CM

## 2020-06-05 DIAGNOSIS — N20.1 CALCULUS OF URETER: ICD-10-CM

## 2020-06-05 NOTE — H&P PST ADULT - PRO ARRIVE FROM
other (specify)/pt called/home pt called via phone to obtain H and P part before sx due to COVID restrictions/home/other (specify)

## 2020-06-05 NOTE — H&P PST ADULT - NSICDXPASTSURGICALHX_GEN_ALL_CORE_FT
PAST SURGICAL HISTORY:  S/P appendectomy 25 years ago    S/P TURP 2015 PAST SURGICAL HISTORY:  Kidney stone s/p cytoscopy, ureteroscopy  with right ureteral stent placement 5/26/2020    S/P appendectomy 25 years ago    S/P TURP 2015

## 2020-06-05 NOTE — H&P PST ADULT - PRIMARY CARE PROVIDER
PCP bob Johnson PCP [phone], Cardiology Micki Logan [phone] PCP Sonya Johnson PCP [phone], Cardiology Micki Logan [phone]

## 2020-06-05 NOTE — H&P PST ADULT - GENERAL GENITOURINARY SYMPTOMS
increased urinary frequency/flank pain R/hematuria/dysuria/urinary retention, calculus of ureter , BPH, urethral stricture,

## 2020-06-05 NOTE — H&P PST ADULT - NEGATIVE OPHTHALMOLOGIC SYMPTOMS
no discharge L/no blurred vision L/no pain R/no lacrimation L/no lacrimation R/no blurred vision R/no discharge R/no pain L/no diplopia/no photophobia

## 2020-06-05 NOTE — H&P PST ADULT - NSICDXPASTMEDICALHX_GEN_ALL_CORE_FT
PAST MEDICAL HISTORY:  BPH (benign prostatic hyperplasia)     H/O urinary retention     H/O: urethral stricture hx of    Kidney stone right side PAST MEDICAL HISTORY:  BPH (benign prostatic hyperplasia)     H/O urinary retention Roger to leg bag 5/26/2020 due to BPH    H/O: urethral stricture hx of    Hematuria     Kidney stone right side PAST MEDICAL HISTORY:  BPH (benign prostatic hyperplasia)     Enlarged lymph node in neck right side    H/O urinary retention Roger to leg bag 5/26/2020 due to BPH    H/O: urethral stricture hx of    Hematuria     History of UTI PT HOSPITALIZED 9/11/2020- 9/, currently signs and symptoms of sepsis or infection    Kidney stone right side

## 2020-06-05 NOTE — H&P PST ADULT - RS GEN PE MLT RESP DETAILS PC
no rales/clear to auscultation bilaterally/no wheezes/no rhonchi/breath sounds equal/good air movement/respirations non-labored/airway patent

## 2020-06-05 NOTE — H&P PST ADULT - ADDITIONAL PE
PE done ad day of sx. Pt was hospitalized 6/9/2020- 6/14/2020 for urosepsis, tx with abx, all new labs in chart, PA and Surgeon note in chart , pt on Cipro for pseudomonas in urine , denies any fever chills, or signs of infection

## 2020-06-05 NOTE — H&P PST ADULT - NEGATIVE GASTROINTESTINAL SYMPTOMS
no constipation/no abdominal pain/no vomiting/no nausea/no diarrhea/no change in bowel habits/no flatulence

## 2020-06-05 NOTE — H&P PST ADULT - NEGATIVE CARDIOVASCULAR SYMPTOMS
no palpitations/no claudication/no dyspnea on exertion/no paroxysmal nocturnal dyspnea/no orthopnea/no peripheral edema/no chest pain

## 2020-06-05 NOTE — H&P PST ADULT - HISTORY OF PRESENT ILLNESS
69 years old male called to be evaluated before surgery, patient had cytoscopy, ureteroscopy  with right ureteral stent placement after seen at ED and admitted to hospital for hematuria, right flank pain and urinary retention due to BPH. , pt is diagnosed with calculus of ureter, enlarged prostate with lower urinary tract symptoms, other obstructive and reflux uropathy and is scheduled for cytoscopy, transurethral resection of prostate , ureteroscopy , lithotripsy of stone with ureteral stent placement on 6/11/2020. 69 years old male called to be evaluated before surgery, patient had cytoscopy, ureteroscopy  with right ureteral stent placement after seen at ED and admitted to hospital for hematuria, right flank pain and urinary retention due to BPH. , pt is diagnosed with calculus of ureter, enlarged prostate with lower urinary tract symptoms, other obstructive and reflux uropathy and is scheduled for cytoscopy, transurethral resection of prostate , right ureteroscopy , lithotripsy of stone with right  ureteral stent placement on 6/18/2020.   Addendum - pt was admitted to hospital for urosepsis with abx from q11/9- 11/14 , pt had urine cultures positive , Cipro PO was prescribed by Surgeon, surgery prescribed on 6/28/2020.

## 2020-06-05 NOTE — H&P PST ADULT - ASSESSMENT
69 years old male called to be evaluated before surgery, patient had cytoscopy, ureteroscopy  with right ureteral stent placement after seen at ED and admitted to hospital for hematuria, right flank pain and urinary retention due to BPH. , pt is diagnosed with calculus of ureter, enlarged prostate with lower urinary tract symptoms, other obstructive and reflux uropathy and is scheduled for cytoscopy, transurethral resection of prostate , ureteroscopy , lithotripsy of stone with ureteral stent placement on 6/11/2020.        pt is diagnosed with calculus of ureter, enlarged prostate with lower urinary tract symptoms, other obstructive and reflux uropathy   Patient  is scheduled for cytoscopy, transurethral resection of prostate , ureteroscopy , lithotripsy of stone with ureteral stent placement on 6/11/2020. 69 years old male  diagnosed with calculus of ureter, enlarged prostate with lower urinary tract symptoms, other obstructive and reflux uropathy.     Patient  is scheduled for cytoscopy, transurethral resection of prostate , right ureteroscopy , lithotripsy of stone with right ureteral stent placement on 6/11/2020.

## 2020-06-05 NOTE — H&P PST ADULT - NEGATIVE NEUROLOGICAL SYMPTOMS
no headache/no facial palsy/no weakness/no loss of consciousness/no hemiparesis/no generalized seizures/no focal seizures/no paresthesias/no difficulty walking/no confusion/no tremors/no vertigo/no loss of sensation/no syncope/no transient paralysis

## 2020-06-05 NOTE — H&P PST ADULT - NEGATIVE LYMPHATIC SYMPTOMS
no swelling of extremity/no tender lymph nodes/no enlarged lymph nodes no tender lymph nodes/no swelling of extremity

## 2020-06-08 PROBLEM — N20.0 CALCULUS OF KIDNEY: Chronic | Status: ACTIVE | Noted: 2020-06-05

## 2020-06-08 PROBLEM — Z87.898 PERSONAL HISTORY OF OTHER SPECIFIED CONDITIONS: Chronic | Status: ACTIVE | Noted: 2020-06-05

## 2020-06-08 PROBLEM — R31.9 HEMATURIA, UNSPECIFIED: Chronic | Status: ACTIVE | Noted: 2020-06-05

## 2020-06-08 PROBLEM — Z87.448 PERSONAL HISTORY OF OTHER DISEASES OF URINARY SYSTEM: Chronic | Status: ACTIVE | Noted: 2020-06-05

## 2020-06-09 ENCOUNTER — APPOINTMENT (OUTPATIENT)
Dept: DISASTER EMERGENCY | Facility: CLINIC | Age: 70
End: 2020-06-09

## 2020-06-09 ENCOUNTER — INPATIENT (INPATIENT)
Facility: HOSPITAL | Age: 70
LOS: 4 days | Discharge: HOME | DRG: 872 | End: 2020-06-14
Attending: UROLOGY | Admitting: UROLOGY
Payer: COMMERCIAL

## 2020-06-09 VITALS
SYSTOLIC BLOOD PRESSURE: 118 MMHG | TEMPERATURE: 98 F | RESPIRATION RATE: 18 BRPM | HEART RATE: 90 BPM | HEIGHT: 71 IN | DIASTOLIC BLOOD PRESSURE: 60 MMHG | WEIGHT: 160.06 LBS | OXYGEN SATURATION: 99 %

## 2020-06-09 DIAGNOSIS — N39.0 URINARY TRACT INFECTION, SITE NOT SPECIFIED: ICD-10-CM

## 2020-06-09 DIAGNOSIS — N20.0 CALCULUS OF KIDNEY: Chronic | ICD-10-CM

## 2020-06-09 DIAGNOSIS — Z90.49 ACQUIRED ABSENCE OF OTHER SPECIFIED PARTS OF DIGESTIVE TRACT: Chronic | ICD-10-CM

## 2020-06-09 DIAGNOSIS — Z90.79 ACQUIRED ABSENCE OF OTHER GENITAL ORGAN(S): Chronic | ICD-10-CM

## 2020-06-09 LAB
ALBUMIN SERPL ELPH-MCNC: 3.5 G/DL — SIGNIFICANT CHANGE UP (ref 3.5–5)
ALP SERPL-CCNC: 69 U/L — SIGNIFICANT CHANGE UP (ref 40–120)
ALT FLD-CCNC: 19 U/L DA — SIGNIFICANT CHANGE UP (ref 10–60)
ANION GAP SERPL CALC-SCNC: 13 MMOL/L — SIGNIFICANT CHANGE UP (ref 5–17)
APPEARANCE UR: ABNORMAL
APTT BLD: 30.7 SEC — SIGNIFICANT CHANGE UP (ref 27.5–36.3)
AST SERPL-CCNC: 18 U/L — SIGNIFICANT CHANGE UP (ref 10–40)
BACTERIA # UR AUTO: ABNORMAL /HPF
BASOPHILS # BLD AUTO: 0.26 K/UL — HIGH (ref 0–0.2)
BASOPHILS NFR BLD AUTO: 1 % — SIGNIFICANT CHANGE UP (ref 0–2)
BILIRUB SERPL-MCNC: 1.1 MG/DL — SIGNIFICANT CHANGE UP (ref 0.2–1.2)
BILIRUB UR-MCNC: NEGATIVE — SIGNIFICANT CHANGE UP
BLD GP AB SCN SERPL QL: SIGNIFICANT CHANGE UP
BUN SERPL-MCNC: 19 MG/DL — HIGH (ref 7–18)
CALCIUM SERPL-MCNC: 9 MG/DL — SIGNIFICANT CHANGE UP (ref 8.4–10.5)
CHLORIDE SERPL-SCNC: 99 MMOL/L — SIGNIFICANT CHANGE UP (ref 96–108)
CO2 SERPL-SCNC: 23 MMOL/L — SIGNIFICANT CHANGE UP (ref 22–31)
COLOR SPEC: YELLOW — SIGNIFICANT CHANGE UP
CREAT SERPL-MCNC: 1.75 MG/DL — HIGH (ref 0.5–1.3)
DIFF PNL FLD: ABNORMAL
EOSINOPHIL # BLD AUTO: 0 K/UL — SIGNIFICANT CHANGE UP (ref 0–0.5)
EOSINOPHIL NFR BLD AUTO: 0 % — SIGNIFICANT CHANGE UP (ref 0–6)
EPI CELLS # UR: ABNORMAL /HPF
GLUCOSE SERPL-MCNC: 97 MG/DL — SIGNIFICANT CHANGE UP (ref 70–99)
GLUCOSE UR QL: NEGATIVE — SIGNIFICANT CHANGE UP
GRAN CASTS # UR COMP ASSIST: ABNORMAL /LPF
HCT VFR BLD CALC: 40 % — SIGNIFICANT CHANGE UP (ref 39–50)
HGB BLD-MCNC: 13.6 G/DL — SIGNIFICANT CHANGE UP (ref 13–17)
HYALINE CASTS # UR AUTO: ABNORMAL /LPF
INR BLD: 1.15 RATIO — SIGNIFICANT CHANGE UP (ref 0.88–1.16)
KETONES UR-MCNC: NEGATIVE — SIGNIFICANT CHANGE UP
LACTATE SERPL-SCNC: 3.1 MMOL/L — HIGH (ref 0.7–2)
LEUKOCYTE ESTERASE UR-ACNC: ABNORMAL
LIDOCAIN IGE QN: 49 U/L — LOW (ref 73–393)
LYMPHOCYTES # BLD AUTO: 0.51 K/UL — LOW (ref 1–3.3)
LYMPHOCYTES # BLD AUTO: 2 % — LOW (ref 13–44)
MANUAL SMEAR VERIFICATION: SIGNIFICANT CHANGE UP
MCHC RBC-ENTMCNC: 31.9 PG — SIGNIFICANT CHANGE UP (ref 27–34)
MCHC RBC-ENTMCNC: 34 GM/DL — SIGNIFICANT CHANGE UP (ref 32–36)
MCV RBC AUTO: 93.7 FL — SIGNIFICANT CHANGE UP (ref 80–100)
MONOCYTES # BLD AUTO: 1.79 K/UL — HIGH (ref 0–0.9)
MONOCYTES NFR BLD AUTO: 7 % — SIGNIFICANT CHANGE UP (ref 2–14)
NEUTROPHILS # BLD AUTO: 23.06 K/UL — HIGH (ref 1.8–7.4)
NEUTROPHILS NFR BLD AUTO: 88 % — HIGH (ref 43–77)
NEUTS BAND # BLD: 2 % — SIGNIFICANT CHANGE UP (ref 0–8)
NITRITE UR-MCNC: NEGATIVE — SIGNIFICANT CHANGE UP
NRBC # BLD: 0 /100 — SIGNIFICANT CHANGE UP (ref 0–0)
PH UR: 8 — SIGNIFICANT CHANGE UP (ref 5–8)
PLAT MORPH BLD: NORMAL — SIGNIFICANT CHANGE UP
PLATELET # BLD AUTO: 232 K/UL — SIGNIFICANT CHANGE UP (ref 150–400)
POTASSIUM SERPL-MCNC: 4.2 MMOL/L — SIGNIFICANT CHANGE UP (ref 3.5–5.3)
POTASSIUM SERPL-SCNC: 4.2 MMOL/L — SIGNIFICANT CHANGE UP (ref 3.5–5.3)
PROT SERPL-MCNC: 7.1 G/DL — SIGNIFICANT CHANGE UP (ref 6–8.3)
PROT UR-MCNC: 100
PROTHROM AB SERPL-ACNC: 13.1 SEC — HIGH (ref 10–12.9)
RBC # BLD: 4.27 M/UL — SIGNIFICANT CHANGE UP (ref 4.2–5.8)
RBC # FLD: 13.3 % — SIGNIFICANT CHANGE UP (ref 10.3–14.5)
RBC BLD AUTO: NORMAL — SIGNIFICANT CHANGE UP
RBC CASTS # UR COMP ASSIST: ABNORMAL /HPF (ref 0–2)
SODIUM SERPL-SCNC: 135 MMOL/L — SIGNIFICANT CHANGE UP (ref 135–145)
SP GR SPEC: 1.01 — SIGNIFICANT CHANGE UP (ref 1.01–1.02)
UROBILINOGEN FLD QL: NEGATIVE — SIGNIFICANT CHANGE UP
WBC # BLD: 25.62 K/UL — HIGH (ref 3.8–10.5)
WBC # FLD AUTO: 25.62 K/UL — HIGH (ref 3.8–10.5)
WBC UR QL: ABNORMAL /HPF (ref 0–5)

## 2020-06-09 PROCEDURE — 99291 CRITICAL CARE FIRST HOUR: CPT

## 2020-06-09 PROCEDURE — 74176 CT ABD & PELVIS W/O CONTRAST: CPT | Mod: 26

## 2020-06-09 RX ORDER — SODIUM CHLORIDE 9 MG/ML
1000 INJECTION INTRAMUSCULAR; INTRAVENOUS; SUBCUTANEOUS ONCE
Refills: 0 | Status: COMPLETED | OUTPATIENT
Start: 2020-06-09 | End: 2020-06-09

## 2020-06-09 RX ORDER — CEFEPIME 1 G/1
1000 INJECTION, POWDER, FOR SOLUTION INTRAMUSCULAR; INTRAVENOUS EVERY 12 HOURS
Refills: 0 | Status: DISCONTINUED | OUTPATIENT
Start: 2020-06-09 | End: 2020-06-14

## 2020-06-09 RX ORDER — KETOROLAC TROMETHAMINE 30 MG/ML
15 SYRINGE (ML) INJECTION ONCE
Refills: 0 | Status: DISCONTINUED | OUTPATIENT
Start: 2020-06-09 | End: 2020-06-09

## 2020-06-09 RX ORDER — HEPARIN SODIUM 5000 [USP'U]/ML
5000 INJECTION INTRAVENOUS; SUBCUTANEOUS EVERY 8 HOURS
Refills: 0 | Status: DISCONTINUED | OUTPATIENT
Start: 2020-06-09 | End: 2020-06-14

## 2020-06-09 RX ORDER — CEFTRIAXONE 500 MG/1
1000 INJECTION, POWDER, FOR SOLUTION INTRAMUSCULAR; INTRAVENOUS ONCE
Refills: 0 | Status: COMPLETED | OUTPATIENT
Start: 2020-06-09 | End: 2020-06-09

## 2020-06-09 RX ORDER — SODIUM CHLORIDE 9 MG/ML
1000 INJECTION, SOLUTION INTRAVENOUS
Refills: 0 | Status: DISCONTINUED | OUTPATIENT
Start: 2020-06-09 | End: 2020-06-14

## 2020-06-09 RX ORDER — HYDROMORPHONE HYDROCHLORIDE 2 MG/ML
1 INJECTION INTRAMUSCULAR; INTRAVENOUS; SUBCUTANEOUS EVERY 4 HOURS
Refills: 0 | Status: DISCONTINUED | OUTPATIENT
Start: 2020-06-09 | End: 2020-06-10

## 2020-06-09 RX ORDER — ACETAMINOPHEN 500 MG
975 TABLET ORAL ONCE
Refills: 0 | Status: COMPLETED | OUTPATIENT
Start: 2020-06-09 | End: 2020-06-09

## 2020-06-09 RX ORDER — ONDANSETRON 8 MG/1
4 TABLET, FILM COATED ORAL EVERY 6 HOURS
Refills: 0 | Status: DISCONTINUED | OUTPATIENT
Start: 2020-06-09 | End: 2020-06-14

## 2020-06-09 RX ORDER — ACETAMINOPHEN 500 MG
1000 TABLET ORAL ONCE
Refills: 0 | Status: COMPLETED | OUTPATIENT
Start: 2020-06-09 | End: 2020-06-10

## 2020-06-09 RX ADMIN — SODIUM CHLORIDE 1000 MILLILITER(S): 9 INJECTION INTRAMUSCULAR; INTRAVENOUS; SUBCUTANEOUS at 22:32

## 2020-06-09 RX ADMIN — Medication 975 MILLIGRAM(S): at 20:54

## 2020-06-09 RX ADMIN — SODIUM CHLORIDE 1000 MILLILITER(S): 9 INJECTION INTRAMUSCULAR; INTRAVENOUS; SUBCUTANEOUS at 20:54

## 2020-06-09 RX ADMIN — Medication 15 MILLIGRAM(S): at 20:54

## 2020-06-09 RX ADMIN — CEFTRIAXONE 100 MILLIGRAM(S): 500 INJECTION, POWDER, FOR SOLUTION INTRAMUSCULAR; INTRAVENOUS at 22:18

## 2020-06-09 NOTE — ED PROVIDER NOTE - GASTROINTESTINAL, MLM
Right CVA tenderness. Suprapubic tenderness. Roger catheter in place draining oozing yellow urine Right CVA tenderness. Suprapubic tenderness. Roger catheter in place draining yellow urine

## 2020-06-09 NOTE — H&P ADULT - NSHPLABSRESULTS_GEN_ALL_CORE
13.6   25.62 )-----------( 232      ( 09 Jun 2020 19:59 )             40.0   06-09    135  |  99  |  19<H>  ----------------------------<  97  4.2   |  23  |  1.75<H>    Ca    9.0      09 Jun 2020 19:59    TPro  7.1  /  Alb  3.5  /  TBili  1.1  /  DBili  x   /  AST  18  /  ALT  19  /  AlkPhos  69  06-09    < from: CT Abdomen and Pelvis No Cont (06.09.20 @ 21:10) >    IMPRESSION:    Interval placement of right double-J ureteral stent, resulting in improvement in mild right hydroureteronephrosis secondary to 5 mm right distal ureteral stone, progressed compared to the prior study. Trace bilateral perinephric stranding. Correlate with urinalysis and laboratory values to assess for superimposed ascending urinary tract infection.    Continued marked prostatomegaly, cause mass effect upon the bladder base. Correlate with PSA and further prostatic workup is indicated. Bladder wall thickening, difficult to evaluate secondary to decompression by Roger catheter. This may be related to chronic bladder outlet obstruction from enlarged prostate. Correlate with urinalysis and laboratory values to assess for cystitis. Consider nonemergent cystoscopic evaluation to exclude underlying malignancy.     < end of copied text >

## 2020-06-09 NOTE — ED PROVIDER NOTE - PROGRESS NOTE DETAILS
Discussed with urology Aida ESPITIA who will come evaluate patient. Pravin Parra will admit patient to urology service on behalf of Dr. Polo.

## 2020-06-09 NOTE — ED PROVIDER NOTE - PSH
Kidney stone  s/p cytoscopy, ureteroscopy  with right ureteral stent placement 5/26/2020  S/P appendectomy  25 years ago  S/P TURP  2015

## 2020-06-09 NOTE — ED ADULT NURSE NOTE - OBJECTIVE STATEMENT
pt reports back pain   decreased urine output, pain  with hall in place x2 days  has procedure scheduled for thursday

## 2020-06-09 NOTE — ED PROVIDER NOTE - CLINICAL SUMMARY MEDICAL DECISION MAKING FREE TEXT BOX
70 yo M with right sided flank and suprapubic pain s/p stent placement. Patient with leukocytosis and + UA. CT remarkable for cystitis. Septic secondary to UTI. Urology consulted and will admit.

## 2020-06-09 NOTE — ED PROVIDER NOTE - PMH
BPH (benign prostatic hyperplasia)    H/O urinary retention  Roger to leg bag 5/26/2020 due to BPH  H/O: urethral stricture  hx of  Hematuria    Kidney stone  right side

## 2020-06-09 NOTE — H&P ADULT - NSICDXPASTMEDICALHX_GEN_ALL_CORE_FT
PAST MEDICAL HISTORY:  BPH (benign prostatic hyperplasia)     H/O urinary retention Roger to leg bag 5/26/2020 due to BPH    H/O: urethral stricture hx of    Hematuria     Kidney stone right side

## 2020-06-09 NOTE — ED PROVIDER NOTE - CARE PLAN
Principal Discharge DX:	UTI (urinary tract infection)  Secondary Diagnosis:	Sepsis Principal Discharge DX:	UTI (urinary tract infection)  Secondary Diagnosis:	Sepsis  Secondary Diagnosis:	ENRIQUE (acute kidney injury)

## 2020-06-09 NOTE — H&P ADULT - NSICDXPASTSURGICALHX_GEN_ALL_CORE_FT
PAST SURGICAL HISTORY:  Kidney stone s/p cytoscopy, ureteroscopy  with right ureteral stent placement 5/26/2020    S/P appendectomy 25 years ago    S/P TURP 2015

## 2020-06-09 NOTE — H&P ADULT - NSHPPHYSICALEXAM_GEN_ALL_CORE
Vital Signs Last 24 Hrs  T(C): 36.8 (09 Jun 2020 19:03), Max: 36.8 (09 Jun 2020 19:03)  T(F): 98.2 (09 Jun 2020 19:03), Max: 98.2 (09 Jun 2020 19:03)  HR: 90 (09 Jun 2020 19:03) (90 - 90)  BP: 118/60 (09 Jun 2020 19:03) (118/60 - 118/60)  BP(mean): --  RR: 18 (09 Jun 2020 19:03) (18 - 18)  SpO2: 99% (09 Jun 2020 19:03) (99% - 99%)    General:  A&Ox3,Appears stated age, No acute distress,  Head: NC/AT  EENT: PERRLA. EOMI. Conjunctiva and sclera clear. Pharynx clear.  Neck: Supple. No JVD  Lungs: CTA B/l. Nonlabored Respirations  CV: +S1S2, RRR  Abdomen: Soft, Nondistended,  suprapubic tenderness,, no guarding, no rebound  Back; +CVA tenderness  : hall in place with dark conc urine  Extremities: Warm and well perfused. 2+ peripheral pulses b/l. Calf soft, nontender b/l. No pedal edema. Vital Signs Last 24 Hrs  T(C): 36.8 (09 Jun 2020 19:03), Max: 36.8 (09 Jun 2020 19:03)  T(F): 98.2 (09 Jun 2020 19:03), Max: 98.2 (09 Jun 2020 19:03)  HR: 90 (09 Jun 2020 19:03) (90 - 90)  BP: 118/60 (09 Jun 2020 19:03) (118/60 - 118/60)  BP(mean): --  RR: 18 (09 Jun 2020 19:03) (18 - 18)  SpO2: 99% (09 Jun 2020 19:03) (99% - 99%)    General:  A&Ox3,Appears stated age, No acute distress,  Head: NC/AT  EENT: PERRLA. EOMI. Conjunctiva and sclera clear. Pharynx clear.  Neck: Supple. No JVD  Lungs: CTA B/l. Nonlabored Respirations  CV: +S1S2, RRR  Abdomen: Soft, Nondistended,  suprapubic tenderness,, no guarding, no rebound  Back; +CVA tenderness  : hall in place with dark concentrated urine  Extremities: Warm and well perfused. 2+ peripheral pulses b/l. Calf soft, nontender b/l. No pedal edema.

## 2020-06-09 NOTE — H&P ADULT - ASSESSMENT
70 y/o male with PMHx of BPH, urinary retention, urethral stricture, hematuria, kidney stone ,S/P appendectomy and TURP presents to the ED with complaints of worsening right-sided flank pain and suprapubic for the past 2-3 days. Admits irritation upon urination. , subjective fevers and chills yesterday. Patient was admitted at the end of May for right ureteral stone, prostatic urethral stricture s/p stricture dilation, right ureteral stent placement and he is scheduled for cytoscopy, transurethral resection of prostate , ureteroscopy , lithotripsy of stone with ureteral stent placement on 6/11/2020. Today afebrile but with rt flank/suprapubic tenderness, labs with leucocytosis, lactatemia up to 3.1, ENRIQUE and UTI. CT scan with double J stent +improved mild hydronephrosis + 5mm stone + perinephric stranding .  Pt will be admitted for a Possible Urosepsis.     Admit to Urology under Dr Polo   NPO, IVF on NPO   IV Abx-Cefepime ( pt has double J stent + long term indwelling catheter. r/o Pseudomonas )  Pain and nausea control   f/up labs /UA/BCx/UCx and trend lactate  DVT PPx 68 y/o male with PMHx of BPH, urinary retention, urethral stricture, hematuria, kidney stone ,S/P appendectomy and TURP presents to the ED with complaints of worsening right-sided flank pain and suprapubic for the past 2-3 days. Admits irritation upon urination. , subjective fevers and chills yesterday. Patient was admitted at the end of May for right ureteral stone, prostatic urethral stricture s/p stricture dilation, right ureteral stent placement and he is scheduled for cytoscopy, transurethral resection of prostate , ureteroscopy , lithotripsy of stone with ureteral stent placement on 6/11/2020. Today afebrile but with rt flank/suprapubic tenderness, labs with leucocytosis, lactatemia up to 3.1, ENRIQUE and UTI. CT scan with double J stent +improved mild hydronephrosis + 5mm stone + perinephric stranding .  Pt will be admitted for a Possible Urosepsis.     Admit to Urology under Dr Polo   NPO, IVF on NPO   IV Abx-Cefepime ( pt has double J stent + long term indwelling catheter. r/o Pseudomonas )  Pain and nausea control   f/up labs /UA/BCx/UCx and trend lactate  R/o COVID STAT  DVT PPx 70 y/o male with sepsis secondary to a urinary tract infection  -Admit to Urology under Dr Polo   -IVF  -Labs reviewed; trend WBC, lactate, and creatinine  -CT images reviewed  -IV Abx-Cefepime   -F/U cultures  -Pain and nausea control   -f/up labs /UA/BCx/UCx and trend lactate  -DVT PPx  -Discussed with PA staff and patient  -we will postpone surgery until resolution of acute infection

## 2020-06-09 NOTE — H&P ADULT - HISTORY OF PRESENT ILLNESS
68 y/o male with PMHx of BPH, urinary retention, urethral stricture, hematuria,kidney stone ,S/P appendectomy and TURP presents to the ED with complaints of worsening right-sided flank pain and suprapubic for the past 2-3 days. Admits irritation upon urination. , subjective fevers and chills yesterday. Denies chest pain, shortness of breath, abdominal pain, diarrhea, constipation, or any other acute complaints. Of note, patient was admitted at the end of May for right ureteral stone, prostatic urethral stricture s/p stricture dilation, right ureteral stent placement and he is scheduled for cytoscopy, transurethral resection of prostate , ureteroscopy , lithotripsy of stone with ureteral stent placement on 6/11/2020. No other complaints at this time . 70 y/o male with PMHx of BPH, urinary retention, urethral stricture, hematuria,kidney stone ,S/P appendectomy and TURP presents to the ED with complaints of worsening right-sided flank pain and suprapubic for the past 2-3 days. Admits irritation upon urination. Reports subjective fevers and chills yesterday. Denies chest pain, shortness of breath, abdominal pain, diarrhea, constipation, or any other acute complaints. Of note, patient was admitted at the end of May for right ureteral stone, prostatic urethral stricture s/p stricture dilation, right ureteral stent placement and he is scheduled for cytoscopy, transurethral resection of prostate, ureteroscopy, laser lithotripsy of stone with ureteral stent placement on 6/11/2020. No other complaints at this time. No specific timing to his symptoms.  No aggravating or alleviating factors that he knows of.

## 2020-06-09 NOTE — ED PROVIDER NOTE - OBJECTIVE STATEMENT
69 year old male with PMHx of BPH, urinary retention, urethral stricture, hematuria, and kidney stone and PSHx of S/P appendectomy and S/P TURP presents to the ED with complaints of worsening right-sided flank pain and suprapubic for the past 2-3 days. Patient had a cytoscopy, ureteroscopy with right ureteral stent placement after being seen at the ED and admitted to hospital for hematuria, right flank pain and urinary retention due to BPH. Patient is diagnosed with calculus of ureter, enlarged prostate with lower urinary tract symptoms, other obstructive and reflux uropathy and is scheduled for cytoscopy, transurethral resection of prostate, ureteroscopy, lithotripsy of stone with ureteral stent placement on 6/11/2020. Patient is diagnosed with calculus of ureter, enlarged prostate with lower urinary tract symptoms, other obstructive and reflux uropathy. Patient is currently complaining of irritation upon urination. Patient additionally reports subjective fevers and chills yesterday. Patient has been following up with her urologist Dr. Polo. Patient denies all other acute complaints. Patient has a Roger catheter in place. NKDA. 69 year old male with PMHx of BPH, urinary retention, urethral stricture, hematuria, and kidney stone and PSHx of S/P appendectomy and S/P TURP presents to the ED with complaints of worsening right-sided flank pain and suprapubic for the past 2-3 days.  Patient is currently complaining of irritation upon urination. Patient additionally reports subjective fevers and chills yesterday. Patient has been following up with her urologist Dr. Polo. Denies chest pain, shortness of breath, abdominal pain, diarrhea, constipation, or any other acute complaints.    Patient was admitted at the end of May for right ureteral stone, prostatic urethral stricture s/p stricture dilation, right ureteral stent placement. He is scheduled for a scheduled for cytoscopy, transurethral resection of prostate , ureteroscopy , lithotripsy of stone with ureteral stent placement on 6/11/2020. 69 year old male with PMHx of BPH, urinary retention, urethral stricture, hematuria, and kidney stone and PSHx of S/P appendectomy and S/P TURP presents to the ED with complaints of worsening right-sided flank pain and suprapubic for the past 2-3 days.  Patient is currently complaining of irritation upon urination. Patient additionally reports subjective fevers and chills yesterday. Denies chest pain, shortness of breath, abdominal pain, diarrhea, constipation, or any other acute complaints.    Patient was admitted at the end of May for right ureteral stone, prostatic urethral stricture s/p stricture dilation, right ureteral stent placement. He is scheduled for a scheduled for cytoscopy, transurethral resection of prostate , ureteroscopy , lithotripsy of stone with ureteral stent placement on 6/11/2020.

## 2020-06-09 NOTE — ED ADULT NURSE NOTE - NSIMPLEMENTINTERV_GEN_ALL_ED
Implemented All Universal Safety Interventions:  [place] to call system. Call bell, personal items and telephone within reach. Instruct patient to call for assistance. Room bathroom lighting operational. Non-slip footwear when patient is off stretcher. Physically safe environment: no spills, clutter or unnecessary equipment. Stretcher in lowest position, wheels locked, appropriate side rails in place.

## 2020-06-10 ENCOUNTER — APPOINTMENT (OUTPATIENT)
Dept: DISASTER EMERGENCY | Facility: CLINIC | Age: 70
End: 2020-06-10

## 2020-06-10 LAB
ANION GAP SERPL CALC-SCNC: 6 MMOL/L — SIGNIFICANT CHANGE UP (ref 5–17)
BASOPHILS # BLD AUTO: 0.06 K/UL — SIGNIFICANT CHANGE UP (ref 0–0.2)
BASOPHILS NFR BLD AUTO: 0.4 % — SIGNIFICANT CHANGE UP (ref 0–2)
BUN SERPL-MCNC: 20 MG/DL — HIGH (ref 7–18)
CALCIUM SERPL-MCNC: 8 MG/DL — LOW (ref 8.4–10.5)
CHLORIDE SERPL-SCNC: 106 MMOL/L — SIGNIFICANT CHANGE UP (ref 96–108)
CO2 SERPL-SCNC: 26 MMOL/L — SIGNIFICANT CHANGE UP (ref 22–31)
CREAT SERPL-MCNC: 1.35 MG/DL — HIGH (ref 0.5–1.3)
EOSINOPHIL # BLD AUTO: 0.02 K/UL — SIGNIFICANT CHANGE UP (ref 0–0.5)
EOSINOPHIL NFR BLD AUTO: 0.1 % — SIGNIFICANT CHANGE UP (ref 0–6)
GLUCOSE SERPL-MCNC: 106 MG/DL — HIGH (ref 70–99)
GRAM STN FLD: SIGNIFICANT CHANGE UP
HCT VFR BLD CALC: 35.1 % — LOW (ref 39–50)
HGB BLD-MCNC: 11.7 G/DL — LOW (ref 13–17)
IMM GRANULOCYTES NFR BLD AUTO: 2.1 % — HIGH (ref 0–1.5)
LACTATE SERPL-SCNC: 1.5 MMOL/L — SIGNIFICANT CHANGE UP (ref 0.7–2)
LYMPHOCYTES # BLD AUTO: 0.52 K/UL — LOW (ref 1–3.3)
LYMPHOCYTES # BLD AUTO: 3.3 % — LOW (ref 13–44)
MCHC RBC-ENTMCNC: 31.5 PG — SIGNIFICANT CHANGE UP (ref 27–34)
MCHC RBC-ENTMCNC: 33.3 GM/DL — SIGNIFICANT CHANGE UP (ref 32–36)
MCV RBC AUTO: 94.4 FL — SIGNIFICANT CHANGE UP (ref 80–100)
METHOD TYPE: SIGNIFICANT CHANGE UP
MONOCYTES # BLD AUTO: 0.65 K/UL — SIGNIFICANT CHANGE UP (ref 0–0.9)
MONOCYTES NFR BLD AUTO: 4.1 % — SIGNIFICANT CHANGE UP (ref 2–14)
NEUTROPHILS # BLD AUTO: 14.25 K/UL — HIGH (ref 1.8–7.4)
NEUTROPHILS NFR BLD AUTO: 90 % — HIGH (ref 43–77)
NRBC # BLD: 0 /100 WBCS — SIGNIFICANT CHANGE UP (ref 0–0)
P AERUGINOSA DNA BLD POS NAA+NON-PROBE: SIGNIFICANT CHANGE UP
PLATELET # BLD AUTO: 187 K/UL — SIGNIFICANT CHANGE UP (ref 150–400)
POTASSIUM SERPL-MCNC: 3.8 MMOL/L — SIGNIFICANT CHANGE UP (ref 3.5–5.3)
POTASSIUM SERPL-SCNC: 3.8 MMOL/L — SIGNIFICANT CHANGE UP (ref 3.5–5.3)
RBC # BLD: 3.72 M/UL — LOW (ref 4.2–5.8)
RBC # FLD: 13.4 % — SIGNIFICANT CHANGE UP (ref 10.3–14.5)
SARS-COV-2 RNA SPEC QL NAA+PROBE: SIGNIFICANT CHANGE UP
SODIUM SERPL-SCNC: 138 MMOL/L — SIGNIFICANT CHANGE UP (ref 135–145)
SPECIMEN SOURCE: SIGNIFICANT CHANGE UP
WBC # BLD: 15.84 K/UL — HIGH (ref 3.8–10.5)
WBC # FLD AUTO: 15.84 K/UL — HIGH (ref 3.8–10.5)

## 2020-06-10 PROCEDURE — 99223 1ST HOSP IP/OBS HIGH 75: CPT

## 2020-06-10 RX ORDER — TAMSULOSIN HYDROCHLORIDE 0.4 MG/1
0.4 CAPSULE ORAL AT BEDTIME
Refills: 0 | Status: DISCONTINUED | OUTPATIENT
Start: 2020-06-10 | End: 2020-06-14

## 2020-06-10 RX ORDER — ACETAMINOPHEN 500 MG
1000 TABLET ORAL ONCE
Refills: 0 | Status: COMPLETED | OUTPATIENT
Start: 2020-06-10 | End: 2020-06-10

## 2020-06-10 RX ADMIN — HEPARIN SODIUM 5000 UNIT(S): 5000 INJECTION INTRAVENOUS; SUBCUTANEOUS at 05:23

## 2020-06-10 RX ADMIN — HEPARIN SODIUM 5000 UNIT(S): 5000 INJECTION INTRAVENOUS; SUBCUTANEOUS at 13:19

## 2020-06-10 RX ADMIN — Medication 400 MILLIGRAM(S): at 18:53

## 2020-06-10 RX ADMIN — CEFEPIME 100 MILLIGRAM(S): 1 INJECTION, POWDER, FOR SOLUTION INTRAMUSCULAR; INTRAVENOUS at 17:01

## 2020-06-10 RX ADMIN — CEFEPIME 100 MILLIGRAM(S): 1 INJECTION, POWDER, FOR SOLUTION INTRAMUSCULAR; INTRAVENOUS at 05:24

## 2020-06-10 RX ADMIN — HYDROMORPHONE HYDROCHLORIDE 1 MILLIGRAM(S): 2 INJECTION INTRAMUSCULAR; INTRAVENOUS; SUBCUTANEOUS at 07:58

## 2020-06-10 RX ADMIN — TAMSULOSIN HYDROCHLORIDE 0.4 MILLIGRAM(S): 0.4 CAPSULE ORAL at 21:15

## 2020-06-10 RX ADMIN — HEPARIN SODIUM 5000 UNIT(S): 5000 INJECTION INTRAVENOUS; SUBCUTANEOUS at 21:15

## 2020-06-10 RX ADMIN — Medication 400 MILLIGRAM(S): at 02:33

## 2020-06-10 RX ADMIN — SODIUM CHLORIDE 120 MILLILITER(S): 9 INJECTION, SOLUTION INTRAVENOUS at 03:41

## 2020-06-10 NOTE — PROGRESS NOTE ADULT - SUBJECTIVE AND OBJECTIVE BOX
INTERVAL HPI/OVERNIGHT EVENTS:  Pt resting comfortably. States not feeling well, c/o back pain and not eating since Saturday.   Roger in place. Denies abd pain.  On Cefepime.    MEDICATIONS  (STANDING):  cefepime   IVPB 1000 milliGRAM(s) IV Intermittent every 12 hours  dextrose 5% + sodium chloride 0.45%. 1000 milliLiter(s) (120 mL/Hr) IV Continuous <Continuous>  heparin   Injectable 5000 Unit(s) SubCutaneous every 8 hours    MEDICATIONS  (PRN):  HYDROmorphone  Injectable 1 milliGRAM(s) IV Push every 4 hours PRN Moderate Pain (4 - 6)  ondansetron Injectable 4 milliGRAM(s) IV Push every 6 hours PRN Nausea    Vital Signs Last 24 Hrs  T(C): 37.2 (10 Reagan 2020 06:00), Max: 37.2 (10 Reagan 2020 06:00)  T(F): 98.9 (10 Reagan 2020 06:00), Max: 98.9 (10 Reagan 2020 06:00)  HR: 77 (10 Reagan 2020 06:00) (72 - 90)  BP: 95/57 (10 Reagan 2020 06:00) (90/45 - 118/60)  BP(mean): --  RR: 18 (10 Reagan 2020 06:00) (17 - 18)  SpO2: 98% (10 Reagan 2020 06:00) (98% - 99%)    Physical:  General: A&Ox3. NAD.  Abdomen: Soft nondistended, no suprapubic tenderness.  Back: No CVAT b/l    I&O's Detail    09 Jun 2020 07:01  -  10 Reagan 2020 07:00  --------------------------------------------------------  IN:  Total IN: 0 mL    OUT:    Indwelling Catheter - Urethral: 1300 mL clear  Total OUT: 1300 mL    Total NET: -1300 mL    LABS:                        11.7   15.84 )-----------( 187      ( 10 Reagan 2020 06:04 )             35.1             06-10    138  |  106  |  20<H>  ----------------------------<  106<H>  3.8   |  26  |  1.35<H>    Ca    8.0<L>      10 Reagan 2020 06:04    TPro  7.1  /  Alb  3.5  /  TBili  1.1  /  DBili  x   /  AST  18  /  ALT  19  /  AlkPhos  69  06-09    Urine Microscopic-Add On (NC) (06.09.20 @ 19:59)    Granular Cast: 2-5 /LPF    Bacteria: Moderate /HPF    Epithelial Cells: Moderate /HPF    Red Blood Cell - Urine: 5-10 /HPF    White Blood Cell - Urine: 11-25 /HPF    Hyaline Casts: 0-2 /LPF    Urinalysis (06.09.20 @ 19:59)    Glucose Qualitative, Urine: Negative    Blood, Urine: Moderate    pH Urine: 8.0    Color: Yellow    Urine Appearance: Slightly Turbid    Bilirubin: Negative    Ketone - Urine: Negative    Specific Gravity: 1.010    Protein, Urine: 100    Urobilinogen: Negative    Nitrite: Negative    Leukocyte Esterase Concentration: Moderate

## 2020-06-10 NOTE — PROVIDER CONTACT NOTE (CRITICAL VALUE NOTIFICATION) - ASSESSMENT
Pt A + O x3, V/S stable.  ABD mild distention, no tenderness. + flatus, -BM. Roger to BSD, urine dark tea color noted, No active bleeding.

## 2020-06-10 NOTE — PROGRESS NOTE ADULT - ASSESSMENT
69y.o. Male with PMH BPH, PSH R ureteral stent placement, sepsis likely secondary to UTI    -con't abx  -f/u cultures  -Roger care  -scheduled surgery delayed until resolution of UTI. Plan to d/c home on abx once stable and reschedule elective procedure  -DVT ppx

## 2020-06-11 LAB
ANION GAP SERPL CALC-SCNC: 7 MMOL/L — SIGNIFICANT CHANGE UP (ref 5–17)
BUN SERPL-MCNC: 15 MG/DL — SIGNIFICANT CHANGE UP (ref 7–18)
CALCIUM SERPL-MCNC: 8.2 MG/DL — LOW (ref 8.4–10.5)
CHLORIDE SERPL-SCNC: 108 MMOL/L — SIGNIFICANT CHANGE UP (ref 96–108)
CO2 SERPL-SCNC: 24 MMOL/L — SIGNIFICANT CHANGE UP (ref 22–31)
CREAT SERPL-MCNC: 1.02 MG/DL — SIGNIFICANT CHANGE UP (ref 0.5–1.3)
GLUCOSE SERPL-MCNC: 107 MG/DL — HIGH (ref 70–99)
HCT VFR BLD CALC: 35.1 % — LOW (ref 39–50)
HGB BLD-MCNC: 11.7 G/DL — LOW (ref 13–17)
LACTATE SERPL-SCNC: 0.9 MMOL/L — SIGNIFICANT CHANGE UP (ref 0.7–2)
MCHC RBC-ENTMCNC: 31.2 PG — SIGNIFICANT CHANGE UP (ref 27–34)
MCHC RBC-ENTMCNC: 33.3 GM/DL — SIGNIFICANT CHANGE UP (ref 32–36)
MCV RBC AUTO: 93.6 FL — SIGNIFICANT CHANGE UP (ref 80–100)
NRBC # BLD: 0 /100 WBCS — SIGNIFICANT CHANGE UP (ref 0–0)
PLATELET # BLD AUTO: 161 K/UL — SIGNIFICANT CHANGE UP (ref 150–400)
POTASSIUM SERPL-MCNC: 3.5 MMOL/L — SIGNIFICANT CHANGE UP (ref 3.5–5.3)
POTASSIUM SERPL-SCNC: 3.5 MMOL/L — SIGNIFICANT CHANGE UP (ref 3.5–5.3)
RBC # BLD: 3.75 M/UL — LOW (ref 4.2–5.8)
RBC # FLD: 13.5 % — SIGNIFICANT CHANGE UP (ref 10.3–14.5)
SODIUM SERPL-SCNC: 139 MMOL/L — SIGNIFICANT CHANGE UP (ref 135–145)
WBC # BLD: 11.46 K/UL — HIGH (ref 3.8–10.5)
WBC # FLD AUTO: 11.46 K/UL — HIGH (ref 3.8–10.5)

## 2020-06-11 PROCEDURE — 99232 SBSQ HOSP IP/OBS MODERATE 35: CPT

## 2020-06-11 PROCEDURE — 93010 ELECTROCARDIOGRAM REPORT: CPT

## 2020-06-11 RX ORDER — MORPHINE SULFATE 50 MG/1
2 CAPSULE, EXTENDED RELEASE ORAL ONCE
Refills: 0 | Status: DISCONTINUED | OUTPATIENT
Start: 2020-06-11 | End: 2020-06-11

## 2020-06-11 RX ORDER — MORPHINE SULFATE 50 MG/1
2 CAPSULE, EXTENDED RELEASE ORAL EVERY 4 HOURS
Refills: 0 | Status: DISCONTINUED | OUTPATIENT
Start: 2020-06-11 | End: 2020-06-14

## 2020-06-11 RX ORDER — KETOROLAC TROMETHAMINE 30 MG/ML
10 SYRINGE (ML) INJECTION EVERY 6 HOURS
Refills: 0 | Status: DISCONTINUED | OUTPATIENT
Start: 2020-06-11 | End: 2020-06-14

## 2020-06-11 RX ORDER — ACETAMINOPHEN 500 MG
1000 TABLET ORAL ONCE
Refills: 0 | Status: COMPLETED | OUTPATIENT
Start: 2020-06-11 | End: 2020-06-12

## 2020-06-11 RX ADMIN — HEPARIN SODIUM 5000 UNIT(S): 5000 INJECTION INTRAVENOUS; SUBCUTANEOUS at 21:03

## 2020-06-11 RX ADMIN — TAMSULOSIN HYDROCHLORIDE 0.4 MILLIGRAM(S): 0.4 CAPSULE ORAL at 21:03

## 2020-06-11 RX ADMIN — MORPHINE SULFATE 2 MILLIGRAM(S): 50 CAPSULE, EXTENDED RELEASE ORAL at 22:34

## 2020-06-11 RX ADMIN — CEFEPIME 100 MILLIGRAM(S): 1 INJECTION, POWDER, FOR SOLUTION INTRAMUSCULAR; INTRAVENOUS at 05:07

## 2020-06-11 RX ADMIN — CEFEPIME 100 MILLIGRAM(S): 1 INJECTION, POWDER, FOR SOLUTION INTRAMUSCULAR; INTRAVENOUS at 17:07

## 2020-06-11 RX ADMIN — Medication 10 MILLIGRAM(S): at 13:39

## 2020-06-11 RX ADMIN — MORPHINE SULFATE 2 MILLIGRAM(S): 50 CAPSULE, EXTENDED RELEASE ORAL at 20:47

## 2020-06-11 RX ADMIN — HEPARIN SODIUM 5000 UNIT(S): 5000 INJECTION INTRAVENOUS; SUBCUTANEOUS at 05:08

## 2020-06-11 NOTE — PROGRESS NOTE ADULT - ASSESSMENT
69y.o. Male with PMH BPH, PSH R ureteral stent placement, sepsis likely secondary to UTI    -con't abx  -f/u urine cultures  -Roger care, pt will go home with leg bag  -scheduled surgery delayed until resolution of UTI. Plan to d/c home on abx once stable and reschedule elective procedure  -DVT ppx 69y.o. Male with PMH BPH, PSH R ureteral stent placement, sepsis likely secondary to UTI    -con't abx  -f/u urine cultures  -Roger care, pt will go home with leg bag  -scheduled surgery delayed until resolution of UTI. Plan to d/c home on abx once stable and reschedule elective procedure  -DVT ppx  -labs reviewed  -Discussed with PA staff

## 2020-06-11 NOTE — PROGRESS NOTE ADULT - SUBJECTIVE AND OBJECTIVE BOX
Pt resting comfortably. Reports feeling better  Hall in place with clear urine. Denies abd pain. reports right flank pain  On Cefepime.    MEDICATIONS  (STANDING):  cefepime   IVPB 1000 milliGRAM(s) IV Intermittent every 12 hours  dextrose 5% + sodium chloride 0.45%. 1000 milliLiter(s) (120 mL/Hr) IV Continuous <Continuous>  heparin   Injectable 5000 Unit(s) SubCutaneous every 8 hours    MEDICATIONS  (PRN):  HYDROmorphone  Injectable 1 milliGRAM(s) IV Push every 4 hours PRN Moderate Pain (4 - 6)  ondansetron Injectable 4 milliGRAM(s) IV Push every 6 hours PRN Nausea    Vital Signs Last 24 Hrs  T(C): 37.3 (11 Jun 2020 05:42), Max: 39.3 (10 Reagan 2020 13:03)  T(F): 99.2 (11 Jun 2020 05:42), Max: 102.7 (10 Reagan 2020 13:03)  HR: 71 (11 Jun 2020 05:42) (70 - 99)  BP: 106/53 (11 Jun 2020 05:42) (92/52 - 106/53)  BP(mean): --  RR: 18 (11 Jun 2020 05:42) (16 - 18)  SpO2: 98% (11 Jun 2020 05:42) (97% - 100%)    Physical:  General: A&Ox3. NAD.  Abdomen: Soft nondistended, no suprapubic tenderness.  Back: No CVAT b/l    hall in place 1850mL     LABS:                          11.7   11.46 )-----------( 161      ( 11 Jun 2020 05:56 )             35.1   06-11    139  |  108  |  15  ----------------------------<  107<H>  3.5   |  24  |  1.02    Ca    8.2<L>      11 Jun 2020 05:56    TPro  7.1  /  Alb  3.5  /  TBili  1.1  /  DBili  x   /  AST  18  /  ALT  19  /  AlkPhos  69  06-09

## 2020-06-12 LAB
-  AMIKACIN: SIGNIFICANT CHANGE UP
-  AZTREONAM: SIGNIFICANT CHANGE UP
-  CEFEPIME: SIGNIFICANT CHANGE UP
-  CEFTAZIDIME: SIGNIFICANT CHANGE UP
-  CIPROFLOXACIN: SIGNIFICANT CHANGE UP
-  GENTAMICIN: SIGNIFICANT CHANGE UP
-  IMIPENEM: SIGNIFICANT CHANGE UP
-  LEVOFLOXACIN: SIGNIFICANT CHANGE UP
-  MEROPENEM: SIGNIFICANT CHANGE UP
-  PIPERACILLIN/TAZOBACTAM: SIGNIFICANT CHANGE UP
-  TOBRAMYCIN: SIGNIFICANT CHANGE UP
ANION GAP SERPL CALC-SCNC: 7 MMOL/L — SIGNIFICANT CHANGE UP (ref 5–17)
BUN SERPL-MCNC: 9 MG/DL — SIGNIFICANT CHANGE UP (ref 7–18)
CALCIUM SERPL-MCNC: 8.5 MG/DL — SIGNIFICANT CHANGE UP (ref 8.4–10.5)
CHLORIDE SERPL-SCNC: 107 MMOL/L — SIGNIFICANT CHANGE UP (ref 96–108)
CO2 SERPL-SCNC: 27 MMOL/L — SIGNIFICANT CHANGE UP (ref 22–31)
CREAT SERPL-MCNC: 0.91 MG/DL — SIGNIFICANT CHANGE UP (ref 0.5–1.3)
CULTURE RESULTS: SIGNIFICANT CHANGE UP
CULTURE RESULTS: SIGNIFICANT CHANGE UP
GLUCOSE SERPL-MCNC: 94 MG/DL — SIGNIFICANT CHANGE UP (ref 70–99)
HCT VFR BLD CALC: 34.9 % — LOW (ref 39–50)
HGB BLD-MCNC: 11.8 G/DL — LOW (ref 13–17)
MCHC RBC-ENTMCNC: 31.4 PG — SIGNIFICANT CHANGE UP (ref 27–34)
MCHC RBC-ENTMCNC: 33.8 GM/DL — SIGNIFICANT CHANGE UP (ref 32–36)
MCV RBC AUTO: 92.8 FL — SIGNIFICANT CHANGE UP (ref 80–100)
METHOD TYPE: SIGNIFICANT CHANGE UP
NRBC # BLD: 0 /100 WBCS — SIGNIFICANT CHANGE UP (ref 0–0)
ORGANISM # SPEC MICROSCOPIC CNT: SIGNIFICANT CHANGE UP
PLATELET # BLD AUTO: 159 K/UL — SIGNIFICANT CHANGE UP (ref 150–400)
POTASSIUM SERPL-MCNC: 4.2 MMOL/L — SIGNIFICANT CHANGE UP (ref 3.5–5.3)
POTASSIUM SERPL-SCNC: 4.2 MMOL/L — SIGNIFICANT CHANGE UP (ref 3.5–5.3)
RBC # BLD: 3.76 M/UL — LOW (ref 4.2–5.8)
RBC # FLD: 13.6 % — SIGNIFICANT CHANGE UP (ref 10.3–14.5)
SODIUM SERPL-SCNC: 141 MMOL/L — SIGNIFICANT CHANGE UP (ref 135–145)
SPECIMEN SOURCE: SIGNIFICANT CHANGE UP
SPECIMEN SOURCE: SIGNIFICANT CHANGE UP
WBC # BLD: 9.8 K/UL — SIGNIFICANT CHANGE UP (ref 3.8–10.5)
WBC # FLD AUTO: 9.8 K/UL — SIGNIFICANT CHANGE UP (ref 3.8–10.5)

## 2020-06-12 PROCEDURE — 99232 SBSQ HOSP IP/OBS MODERATE 35: CPT

## 2020-06-12 RX ORDER — SENNA PLUS 8.6 MG/1
2 TABLET ORAL ONCE
Refills: 0 | Status: DISCONTINUED | OUTPATIENT
Start: 2020-06-12 | End: 2020-06-14

## 2020-06-12 RX ORDER — POLYETHYLENE GLYCOL 3350 17 G/17G
17 POWDER, FOR SOLUTION ORAL ONCE
Refills: 0 | Status: COMPLETED | OUTPATIENT
Start: 2020-06-12 | End: 2020-06-12

## 2020-06-12 RX ADMIN — TAMSULOSIN HYDROCHLORIDE 0.4 MILLIGRAM(S): 0.4 CAPSULE ORAL at 21:07

## 2020-06-12 RX ADMIN — HEPARIN SODIUM 5000 UNIT(S): 5000 INJECTION INTRAVENOUS; SUBCUTANEOUS at 13:02

## 2020-06-12 RX ADMIN — Medication 400 MILLIGRAM(S): at 14:21

## 2020-06-12 RX ADMIN — Medication 1 ENEMA: at 13:02

## 2020-06-12 RX ADMIN — HEPARIN SODIUM 5000 UNIT(S): 5000 INJECTION INTRAVENOUS; SUBCUTANEOUS at 05:07

## 2020-06-12 RX ADMIN — CEFEPIME 100 MILLIGRAM(S): 1 INJECTION, POWDER, FOR SOLUTION INTRAMUSCULAR; INTRAVENOUS at 17:12

## 2020-06-12 RX ADMIN — POLYETHYLENE GLYCOL 3350 17 GRAM(S): 17 POWDER, FOR SOLUTION ORAL at 21:07

## 2020-06-12 RX ADMIN — CEFEPIME 100 MILLIGRAM(S): 1 INJECTION, POWDER, FOR SOLUTION INTRAMUSCULAR; INTRAVENOUS at 05:07

## 2020-06-12 NOTE — PROGRESS NOTE ADULT - ASSESSMENT
Very pleasant 69 year old gentleman with sepsis secondary to UTI  -Continue antibiotics  -F/U final blood and urine cultures  -repeat Blood cultures  -ID consult

## 2020-06-12 NOTE — PROGRESS NOTE ADULT - SUBJECTIVE AND OBJECTIVE BOX
Patient seen/examined. Reports that he felt well until this morning when he developed shaking chills. No fever at that time. BCx + and UCx Pseudomonas.    Vital Signs Last 24 Hrs  T(C): 37.1 (12 Jun 2020 06:32), Max: 37.2 (12 Jun 2020 05:43)  T(F): 98.8 (12 Jun 2020 06:32), Max: 98.9 (12 Jun 2020 05:43)  HR: 107 (12 Jun 2020 06:32) (74 - 107)  BP: 141/105 (12 Jun 2020 06:32) (118/63 - 157/86)  BP(mean): --  RR: 16 (12 Jun 2020 06:32) (16 - 18)  SpO2: 100% (12 Jun 2020 06:32) (98% - 100%)    General: NAD. AAOx3  Abd: soft. NT/ND  : Roger with clear urine                          11.8   9.80  )-----------( 159      ( 12 Jun 2020 05:29 )             34.9     06-12    141  |  107  |  9   ----------------------------<  94  4.2   |  27  |  0.91    Ca    8.5      12 Jun 2020 05:29    Culture - Blood (06.10.20 @ 02:51)    -  Pseudomonas aeruginosa: Detec    Gram Stain:   Growth in aerobic bottle: Gram Negative Rods    Specimen Source: .Blood Blood-Peripheral    Organism: Blood Culture PCR    Culture Results:   Growth in aerobic bottle: Pseudomonas aeruginosa  "Due to technical problems, Proteus sp. will Not be reported as part of  the BCID panel until further notice"  ***Blood Panel PCR results on this specimen are available  approximately 3 hours after the Gram stain result.***  Gram stain, PCR, and/or culture results may not always  correspond due to difference in methodologies.  ************************************************************  This PCR assay was performed using [employer].  Thefollowing targets are tested for: Enterococcus,  vancomycin resistant enterococci, Listeria monocytogenes,  coagulase negative staphylococci, S. aureus,  methicillin resistant S. aureus, Streptococcus agalactiae  (Group B), S. pneumoniae, S. pyogenes(Group A),  Acinetobacter baumannii, Enterobacter cloacae, E. coli,  Klebsiella oxytoca, K. pneumoniae, Proteus sp.,  Serratia marcescens, Haemophilus influenzae,  Neisseria meningitidis, Pseudomonas aeruginosa, Candida  albicans, C. glabrata, C krusei, C parapsilosis,  C. tropicalis and the KPC resistance gene.    Organism Identification: Blood Culture PCR    Method Type: PCR            Culture - Urine (06.10.20 @ 06:19)    Specimen Source: .Urine Catheterized    Culture Results:   >100,000 CFU/ml Gram Negative Rods

## 2020-06-12 NOTE — CONSULT NOTE ADULT - GASTROINTESTINAL DETAILS
soft/bowel sounds normal/no rigidity/no organomegaly/no guarding/no masses palpable/no rebound tenderness/no distention

## 2020-06-12 NOTE — CONSULT NOTE ADULT - GENERAL
CHIEF COMPLAINT:   Chief Complaint   Patient presents with   • Consultation     Vision Auras - worsening the last few years - 2 episodes        HISTORY OF PRESENT ILLNESS:    PAST MEDICAL HISTORY:    Past Medical History:   Diagnosis Date   • CAD (coronary artery disease) 10/10/2018    Ca+ score 1034   • Diverticulosis    • GERD (gastroesophageal reflux disease)    • Glaucoma    • Gout    • Hiatal hernia    • History of impacted ear wax     gets ear wax removed every 6 weeks    • History of shingles     right leg and foot   • Hyperlipidemia    • Hypertension    • IBS (irritable bowel syndrome)    • Macular degeneration    • Migraine    • Prediabetes        MEDICATIONS:    Current Outpatient Medications   Medication Sig Dispense Refill   • pantoprazole (PROTONIX) 40 MG tablet TAKE 1 TABLET BY MOUTH TWO  TIMES DAILY 180 tablet 1   • lisinopril (ZESTRIL) 10 MG tablet TAKE 1 TABLET BY MOUTH  DAILY 90 tablet 1   • HYDROcodone-acetaminophen (NORCO) 5-325 MG per tablet Take 1 tablet by mouth every 6 hours as needed for Pain. 10 tablet 0   • diclofenac (VOLTAREN) 1 % gel Apply 2 g topically 4 times daily. 300 g 0   • PREVIDENT 5000 DRY MOUTH 1.1 % dental gel USE NIGHTLY AFTER REGULAR HYGIENE - BRUSH ON TEETH - SPIT OUT EXCESS - DO NOT RINSE  5   • atorvastatin (LIPITOR) 40 MG tablet TAKE 1 TABLET BY MOUTH  DAILY 90 tablet 2   • Multiple Vitamins-Minerals (PRESERVISION AREDS 2+MULTI VIT PO)      • MISC NATURAL PRODUCTS PO      • MISC NATURAL PRODUCTS PO      • MISC NATURAL PRODUCTS PO      • cetirizine (ZYRTEC ALLERGY) 10 MG tablet Take 1 tablet by mouth daily. 30 tablet 3   • rizatriptan (MAXALT-MLT) 10 MG disintegrating tablet Take 1 tablet by mouth as needed for Migraine. May repeat in 2 hours if needed 10 tablet 3   • aspirin 81 MG tablet Take 1 tablet by mouth daily.       No current facility-administered medications for this visit.        SOCIAL HISTORY:    Social History     Socioeconomic History   • Marital status:  /Civil Union     Spouse name: Not on file   • Number of children: Not on file   • Years of education: Not on file   • Highest education level: Not on file   Occupational History   • Not on file   Social Needs   • Financial resource strain: Not on file   • Food insecurity:     Worry: Not on file     Inability: Not on file   • Transportation needs:     Medical: Not on file     Non-medical: Not on file   Tobacco Use   • Smoking status: Former Smoker     Packs/day: 0.00     Years: 20.00     Pack years: 0.00     Last attempt to quit: 1984     Years since quittin.8   • Smokeless tobacco: Never Used   Substance and Sexual Activity   • Alcohol use: No   • Drug use: No   • Sexual activity: Not on file   Lifestyle   • Physical activity:     Days per week: Not on file     Minutes per session: Not on file   • Stress: Not on file   Relationships   • Social connections:     Talks on phone: Not on file     Gets together: Not on file     Attends Confucianism service: Not on file     Active member of club or organization: Not on file     Attends meetings of clubs or organizations: Not on file     Relationship status: Not on file   • Intimate partner violence:     Fear of current or ex partner: Not on file     Emotionally abused: Not on file     Physically abused: Not on file     Forced sexual activity: Not on file   Other Topics Concern   • Not on file   Social History Narrative   • Not on file       ALLERGIES:  ALLERGIES: no known allergies.    ROS:    Yes No       Constitutional    Musculoskeletal Yes No   Fever [] [x]   Joint Pain [] [x]   Chills []   [x]   Neck Pain [x] []   Headache [] [x]  Back Pain [x] []   Other    Other              Eyes Yes No  Ear/Nose/Throat/Mouth Yes No   Blurred vision [x]  []  Ear infection [] [x]   Double vision []  [x]  Sore Throat [] [x]   Pain [] [x]  Sinus Problems [] [x]   Other    Other              Allergic/Immunologic Yes No  Genitourinary Yes No   Hay Fever [] [x]  Urine  Retention [] [x]   Drug allergies [] [x]  Painful Urination [] [x]   Other    Urinary Frequency [] [x]       Other     Neurological Yes No       Tremors [] [x]  Respiratory Yes No   Dizzy Spells [] [x]  Wheezing [] [x]   Numbness/Tingling [] [x]  Frequent Cough [] [x]   Indigestion [] []  Shortness of Breath [] [x]   Other    Other              Gastrointestinal Yes No  Hematological/Lymphatic Yes No   Abdominal Pain [] [x]  Swollen glands [] [x]   Nausea/Vomiting [] [x]  Blood Clotting problem [] [x]   Indigestion/[place] [] [x]  Other     Other             Psychological Yes No   Cardiovascular Yes No  Generally satisfied with life [x] []   Chest Pain [] [x]  Severely depressed [] [x]   Varicose Veins [] [x]  Considered Suicide [] [x]   High Blood Pressure [x] []  Other     Other             Endocrine Yes No   Integumentary Yes No  Excessive thirst [] [x]   Skin Rash [] [x]  Too hot/cold [] [x]   Boils [] [x]  Tired/sluggish [x] []   Persistent Itch [] [x]  Other     Other                                PHYSICAL EXAM:  There were no vitals taken for this visit.    LABS:   Lab Results   Component Value Date    SODIUM 139 11/21/2019    POTASSIUM 4.4 11/21/2019    GLUCOSE 104 (H) 11/21/2019    BUN 19 11/21/2019    CREATININE 0.94 11/21/2019    CALCIUM 9.0 11/21/2019    MG 2.0 02/10/2017    BILIRUBIN 0.9 02/25/2020    AST 23 02/25/2020    GPT 38 02/25/2020    ALBUMIN 4.0 02/25/2020    TSH 2.736 08/19/2019    PT 11.4 07/06/2017    INR 1.1 07/06/2017    PTT 28 07/06/2017    RBC 4.73 11/21/2019    WBC 7.2 11/21/2019    HGB 14.0 11/21/2019    HCT 42.2 11/21/2019     11/21/2019    RESR 13 03/13/2015     CHOLESTEROL (mg/dL)   Date Value   08/19/2019 122     Cholesterol (mg/dL)   Date Value   02/25/2020 145     HDL (mg/dL)   Date Value   02/25/2020 47   08/19/2019 44     CHOL/HDL (no units)   Date Value   08/19/2019 2.8     Cholesterol/ HDL Ratio (no units)   Date Value   02/25/2020 3.1     TRIGLYCERIDE (mg/dL)   Date  Value   08/19/2019 94     Triglycerides (mg/dL)   Date Value   02/25/2020 124     CALCULATED LDL (mg/dL)   Date Value   08/19/2019 59     LDL (mg/dL)   Date Value   02/25/2020 73       ASSESSMENT & PLAN:   details…

## 2020-06-12 NOTE — CONSULT NOTE ADULT - RS GEN PE MLT RESP DETAILS PC
no rales/breath sounds equal/good air movement/clear to auscultation bilaterally/no rhonchi/no wheezes

## 2020-06-12 NOTE — CONSULT NOTE ADULT - SUBJECTIVE AND OBJECTIVE BOX
HPI:  70 y/o male with PMHx of BPH, urinary retention, urethral stricture, hematuria,kidney stone ,S/P appendectomy and TURP presents to the ED with complaints of worsening right-sided flank pain and suprapubic for the past 2-3 days. Admits irritation upon urination. Reports subjective fevers and chills yesterday. Denies chest pain, shortness of breath, abdominal pain, diarrhea, constipation, or any other acute complaints. Of note, patient was admitted at the end of May for right ureteral stone, prostatic urethral stricture s/p stricture dilation, right ureteral stent placement and he is scheduled for cytoscopy, transurethral resection of prostate, ureteroscopy, laser lithotripsy of stone with ureteral stent placement on 6/11/2020. No other complaints at this time. No specific timing to his symptoms.  No aggravating or alleviating factors that he knows of. (09 Jun 2020 23:11)      PAST MEDICAL & SURGICAL HISTORY:  Hematuria  H/O: urethral stricture: hx of  Kidney stone: right side  H/O urinary retention: Roger to leg bag 5/26/2020 due to BPH  BPH (benign prostatic hyperplasia)  Kidney stone: s/p cytoscopy, ureteroscopy  with right ureteral stent placement 5/26/2020  S/P TURP: 2015  S/P appendectomy: 25 years ago      No Known Allergies      Meds:  acetaminophen  IVPB .. 1000 milliGRAM(s) IV Intermittent once  cefepime   IVPB 1000 milliGRAM(s) IV Intermittent every 12 hours  dextrose 5% + sodium chloride 0.45%. 1000 milliLiter(s) IV Continuous <Continuous>  heparin   Injectable 5000 Unit(s) SubCutaneous every 8 hours  ketorolac 10 milliGRAM(s) Oral every 6 hours PRN  morphine  - Injectable 2 milliGRAM(s) IV Push every 4 hours PRN  ondansetron Injectable 4 milliGRAM(s) IV Push every 6 hours PRN  tamsulosin 0.4 milliGRAM(s) Oral at bedtime      SOCIAL HISTORY:  Smoker:    ETOH use:      FAMILY HISTORY:  No pertinent family history in first degree relatives      VITALS:  Vital Signs Last 24 Hrs  T(C): 36.9 (12 Jun 2020 11:21), Max: 37.2 (12 Jun 2020 05:43)  T(F): 98.5 (12 Jun 2020 11:21), Max: 98.9 (12 Jun 2020 05:43)  HR: 78 (12 Jun 2020 11:21) (74 - 107)  BP: 100/62 (12 Jun 2020 11:21) (100/62 - 157/86)  BP(mean): --  RR: 16 (12 Jun 2020 11:21) (16 - 18)  SpO2: 100% (12 Jun 2020 11:21) (98% - 100%)    LABS/DIAGNOSTIC TESTS:                          11.8   9.80  )-----------( 159      ( 12 Jun 2020 05:29 )             34.9     WBC Count: 9.80 K/uL (06-12 @ 05:29)  WBC Count: 11.46 K/uL (06-11 @ 05:56)  WBC Count: 15.84 K/uL (06-10 @ 06:04)  WBC Count: 25.62 K/uL (06-09 @ 19:59)      06-12    141  |  107  |  9   ----------------------------<  94  4.2   |  27  |  0.91    Ca    8.5      12 Jun 2020 05:29                    LACTATE:    ABG -     CULTURES:   .Urine Catheterized  06-10 @ 06:19   >100,000 CFU/ml Pseudomonas aeruginosa  --  Pseudomonas aeruginosa      .Urine Clean Catch (Midstream)  06-10 @ 02:56   >100,000 CFU/ml Gram Negative Rods  --  Gram Negative Rods      .Blood Blood-Peripheral  06-10 @ 02:51   Growth in aerobic bottle: Pseudomonas aeruginosa              RADIOLOGY: < from: CT Abdomen and Pelvis No Cont (06.09.20 @ 21:10) >  EXAM:  CT ABDOMEN AND PELVIS                            PROCEDURE DATE:  06/09/2020          INTERPRETATION:  CT ABDOMEN AND PELVIS WITHOUT CONTRAST    INDICATION: Abdominal pain.    TECHNIQUE: Abdominopelvic CT without intravenous contrast.Images are reformatted in the sagittal and coronal planes.    COMPARISON: CT Abdomen pelvis 5/20/2020.    FINDINGS:    Absence of intravenous contrast limits evaluation for focal lesions, neoplasm, and vascular pathology.    Lower Thorax: No consolidation oreffusion. Too small to characterize bilateral subpleural based nodules for which nonemergent pulmonary imaging follow-up is advised.    Liver: No suspicious lesions.  Biliary: No dilatation. No calcified gallstones within the gallbladder.  Spleen: Nosuspicious lesions.  Pancreas: No inflammatory changes or ductal dilatation.  Adrenals: Normal.  Kidneys: Interval placement of right double-J ureteral stent, resulting in improvement in mild right hydroureteronephrosis secondary to 5 mm right distalureteral stone on image 116 of series 2, progressed compared to the prior study. Trace bilateral perinephric stranding. Correlate urinalysis and laboratory values to assess for superimposed ascending urinary tract infection.  Vessels: Normal caliber.Atherosclerotic disease of the aorta and its branches.    GI tract: No evidence of small bowel obstruction. No significant bowel wall thickening or inflammatory changes though detailed evaluation of GI tract is limited secondary to inadequate distention. Appendix is not visualized without secondary findings of acute appendicitis.    Peritoneum/retroperitoneum and mesentery: No free air. No organized fluid collection. No adenopathy.    Pelvic organs/Bladder: Continued marked prostatomegaly with prostate measuring approximately 6.3 cm in transverse diameter, cause mass effect upon the bladder base. Correlate with PSA and further prostatic workup is indicated. Bladder wall thickening, difficult to evaluate secondary to decompression by Roger catheter. This may be related to chronic bladder outlet obstruction from enlarged prostate. Correlate with urinalysis and laboratory values to assess for cystitis. Consider nonemergent cystoscopic evaluation to exclude underlying malignancy. Scattered calcified phleboliths identified within the pelvis.    Abdominal wall: Unremarkable.  Bones and soft tissues: Multilevel degenerative changes of the spine noted. Stable sclerotic lesion within the right iliac bone.    IMPRESSION:    Interval placement of right double-J ureteral stent, resulting in improvement in mild right hydroureteronephrosis secondary to 5 mm right distal ureteral stone, progressed compared to the prior study. Trace bilateral perinephric stranding. Correlate with urinalysis and laboratory values to assess for superimposed ascending urinary tract infection.    Continued marked prostatomegaly, cause mass effect upon the bladder base. Correlate with PSA and further prostatic workup is indicated. Bladder wall thickening, difficult to evaluate secondary to decompression by Roger catheter. This may be related to chronic bladder outlet obstruction from enlarged prostate. Correlate with urinalysis and laboratory values to assess for cystitis. Consider nonemergent cystoscopic evaluation to exclude underlying malignancy.     Additional findings as mentioned above.          LIDYA RINCON M.D., ATTENDING RADIOLOGIST  This document has been electronically signed. Jun 9 2020  9:47PM        < end of copied text >        ROS  [  ] UNABLE TO ELICIT HPI:  70 y/o male with PMHx of BPH, urinary retention, urethral stricture, hematuria,kidney stone ,S/P appendectomy and TURP presents to the ED with complaints of worsening right-sided flank pain and suprapubic for the past 2-3 days. Admits irritation upon urination. Reports subjective fevers and chills yesterday. Denies chest pain, shortness of breath, abdominal pain, diarrhea, constipation, or any other acute complaints. Of note, patient was admitted at the end of May for right ureteral stone, prostatic urethral stricture s/p stricture dilation, right ureteral stent placement and he is scheduled for cytoscopy, transurethral resection of prostate, ureteroscopy, laser lithotripsy of stone with ureteral stent placement on 6/11/2020. No other complaints at this time. No specific timing to his symptoms.  No aggravating or alleviating factors that he knows of. (09 Jun 2020 23:11)    History as above, pt looks ill just now and is having rigors in front of me, had PCA do a temp and it is 99.7 orally. He currently has a hall in place and so does not have urinary symptoms at this time, he has lower abdominal pain and right sided flank pain, he was febrile at home, here he was found to have a UTI and Bacteremia with Pseudomonas. He is on Maxipime 1gm iv q12hrs currently. He had a high WBC count on admission and has come down to normal.      PAST MEDICAL & SURGICAL HISTORY:  Hematuria  H/O: urethral stricture: hx of  Kidney stone: right side  H/O urinary retention: Hall to leg bag 5/26/2020 due to BPH  BPH (benign prostatic hyperplasia)  Kidney stone: s/p cytoscopy, ureteroscopy  with right ureteral stent placement 5/26/2020  S/P TURP: 2015  S/P appendectomy: 25 years ago      No Known Allergies      Meds:  acetaminophen  IVPB .. 1000 milliGRAM(s) IV Intermittent once  cefepime   IVPB 1000 milliGRAM(s) IV Intermittent every 12 hours  dextrose 5% + sodium chloride 0.45%. 1000 milliLiter(s) IV Continuous <Continuous>  heparin   Injectable 5000 Unit(s) SubCutaneous every 8 hours  ketorolac 10 milliGRAM(s) Oral every 6 hours PRN  morphine  - Injectable 2 milliGRAM(s) IV Push every 4 hours PRN  ondansetron Injectable 4 milliGRAM(s) IV Push every 6 hours PRN  tamsulosin 0.4 milliGRAM(s) Oral at bedtime      SOCIAL HISTORY:  Smoker: no   ETOH use:  no    FAMILY HISTORY:  No pertinent family history in first degree relatives      VITALS:  Vital Signs Last 24 Hrs  T(C): 36.9 (12 Jun 2020 11:21), Max: 37.2 (12 Jun 2020 05:43)  T(F): 98.5 (12 Jun 2020 11:21), Max: 98.9 (12 Jun 2020 05:43)  HR: 78 (12 Jun 2020 11:21) (74 - 107)  BP: 100/62 (12 Jun 2020 11:21) (100/62 - 157/86)  BP(mean): --  RR: 16 (12 Jun 2020 11:21) (16 - 18)  SpO2: 100% (12 Jun 2020 11:21) (98% - 100%)    LABS/DIAGNOSTIC TESTS:                          11.8   9.80  )-----------( 159      ( 12 Jun 2020 05:29 )             34.9     WBC Count: 9.80 K/uL (06-12 @ 05:29)  WBC Count: 11.46 K/uL (06-11 @ 05:56)  WBC Count: 15.84 K/uL (06-10 @ 06:04)  WBC Count: 25.62 K/uL (06-09 @ 19:59)      06-12    141  |  107  |  9   ----------------------------<  94  4.2   |  27  |  0.91    Ca    8.5      12 Jun 2020 05:29                    LACTATE:    ABG -     CULTURES:   .Urine Catheterized  06-10 @ 06:19   >100,000 CFU/ml Pseudomonas aeruginosa  --  Pseudomonas aeruginosa      .Urine Clean Catch (Midstream)  06-10 @ 02:56   >100,000 CFU/ml Gram Negative Rods  --  Gram Negative Rods      .Blood Blood-Peripheral  06-10 @ 02:51   Growth in aerobic bottle: Pseudomonas aeruginosa              RADIOLOGY: < from: CT Abdomen and Pelvis No Cont (06.09.20 @ 21:10) >  EXAM:  CT ABDOMEN AND PELVIS                            PROCEDURE DATE:  06/09/2020          INTERPRETATION:  CT ABDOMEN AND PELVIS WITHOUT CONTRAST    INDICATION: Abdominal pain.    TECHNIQUE: Abdominopelvic CT without intravenous contrast.Images are reformatted in the sagittal and coronal planes.    COMPARISON: CT Abdomen pelvis 5/20/2020.    FINDINGS:    Absence of intravenous contrast limits evaluation for focal lesions, neoplasm, and vascular pathology.    Lower Thorax: No consolidation oreffusion. Too small to characterize bilateral subpleural based nodules for which nonemergent pulmonary imaging follow-up is advised.    Liver: No suspicious lesions.  Biliary: No dilatation. No calcified gallstones within the gallbladder.  Spleen: Nosuspicious lesions.  Pancreas: No inflammatory changes or ductal dilatation.  Adrenals: Normal.  Kidneys: Interval placement of right double-J ureteral stent, resulting in improvement in mild right hydroureteronephrosis secondary to 5 mm right distalureteral stone on image 116 of series 2, progressed compared to the prior study. Trace bilateral perinephric stranding. Correlate urinalysis and laboratory values to assess for superimposed ascending urinary tract infection.  Vessels: Normal caliber.Atherosclerotic disease of the aorta and its branches.    GI tract: No evidence of small bowel obstruction. No significant bowel wall thickening or inflammatory changes though detailed evaluation of GI tract is limited secondary to inadequate distention. Appendix is not visualized without secondary findings of acute appendicitis.    Peritoneum/retroperitoneum and mesentery: No free air. No organized fluid collection. No adenopathy.    Pelvic organs/Bladder: Continued marked prostatomegaly with prostate measuring approximately 6.3 cm in transverse diameter, cause mass effect upon the bladder base. Correlate with PSA and further prostatic workup is indicated. Bladder wall thickening, difficult to evaluate secondary to decompression by Hall catheter. This may be related to chronic bladder outlet obstruction from enlarged prostate. Correlate with urinalysis and laboratory values to assess for cystitis. Consider nonemergent cystoscopic evaluation to exclude underlying malignancy. Scattered calcified phleboliths identified within the pelvis.    Abdominal wall: Unremarkable.  Bones and soft tissues: Multilevel degenerative changes of the spine noted. Stable sclerotic lesion within the right iliac bone.    IMPRESSION:    Interval placement of right double-J ureteral stent, resulting in improvement in mild right hydroureteronephrosis secondary to 5 mm right distal ureteral stone, progressed compared to the prior study. Trace bilateral perinephric stranding. Correlate with urinalysis and laboratory values to assess for superimposed ascending urinary tract infection.    Continued marked prostatomegaly, cause mass effect upon the bladder base. Correlate with PSA and further prostatic workup is indicated. Bladder wall thickening, difficult to evaluate secondary to decompression by Hall catheter. This may be related to chronic bladder outlet obstruction from enlarged prostate. Correlate with urinalysis and laboratory values to assess for cystitis. Consider nonemergent cystoscopic evaluation to exclude underlying malignancy.     Additional findings as mentioned above.          LIDYA RINCON M.D., ATTENDING RADIOLOGIST  This document has been electronically signed. Jun 9 2020  9:47PM        < end of copied text >        ROS  [  ] UNABLE TO ELICIT

## 2020-06-12 NOTE — CONSULT NOTE ADULT - ASSESSMENT
Sepsis  UTI  Bacteremia  Fevers - improving  Leukocytosis - normalized    Plan - Cont Maxipime 1 gm iv q12hrs pending sensitivity of organism.  repeat blood cultures tomorrow.

## 2020-06-13 LAB
-  AMIKACIN: SIGNIFICANT CHANGE UP
-  AZTREONAM: SIGNIFICANT CHANGE UP
-  CEFEPIME: SIGNIFICANT CHANGE UP
-  CEFTAZIDIME: SIGNIFICANT CHANGE UP
-  CIPROFLOXACIN: SIGNIFICANT CHANGE UP
-  GENTAMICIN: SIGNIFICANT CHANGE UP
-  IMIPENEM: SIGNIFICANT CHANGE UP
-  LEVOFLOXACIN: SIGNIFICANT CHANGE UP
-  MEROPENEM: SIGNIFICANT CHANGE UP
-  PIPERACILLIN/TAZOBACTAM: SIGNIFICANT CHANGE UP
-  TOBRAMYCIN: SIGNIFICANT CHANGE UP
CULTURE RESULTS: NO GROWTH — SIGNIFICANT CHANGE UP
CULTURE RESULTS: SIGNIFICANT CHANGE UP
CULTURE RESULTS: SIGNIFICANT CHANGE UP
METHOD TYPE: SIGNIFICANT CHANGE UP
ORGANISM # SPEC MICROSCOPIC CNT: SIGNIFICANT CHANGE UP
SPECIMEN SOURCE: SIGNIFICANT CHANGE UP
SPECIMEN SOURCE: SIGNIFICANT CHANGE UP

## 2020-06-13 PROCEDURE — 99232 SBSQ HOSP IP/OBS MODERATE 35: CPT

## 2020-06-13 RX ORDER — SODIUM CHLORIDE 9 MG/ML
500 INJECTION INTRAMUSCULAR; INTRAVENOUS; SUBCUTANEOUS ONCE
Refills: 0 | Status: COMPLETED | OUTPATIENT
Start: 2020-06-13 | End: 2020-06-13

## 2020-06-13 RX ORDER — ACETAMINOPHEN 500 MG
650 TABLET ORAL EVERY 6 HOURS
Refills: 0 | Status: DISCONTINUED | OUTPATIENT
Start: 2020-06-13 | End: 2020-06-14

## 2020-06-13 RX ADMIN — CEFEPIME 100 MILLIGRAM(S): 1 INJECTION, POWDER, FOR SOLUTION INTRAMUSCULAR; INTRAVENOUS at 05:45

## 2020-06-13 RX ADMIN — SODIUM CHLORIDE 250 MILLILITER(S): 9 INJECTION INTRAMUSCULAR; INTRAVENOUS; SUBCUTANEOUS at 16:25

## 2020-06-13 RX ADMIN — CEFEPIME 100 MILLIGRAM(S): 1 INJECTION, POWDER, FOR SOLUTION INTRAMUSCULAR; INTRAVENOUS at 17:01

## 2020-06-13 NOTE — PROGRESS NOTE ADULT - SUBJECTIVE AND OBJECTIVE BOX
Patient seen/examined. Feels much better today. No dysuria. No hematuria. Repeat BCx pending. Febrile to 100.6 at 6 am but now feels better.    Vital Signs Last 24 Hrs  T(C): 38.1 (13 Jun 2020 06:00), Max: 38.1 (13 Jun 2020 06:00)  T(F): 100.6 (13 Jun 2020 06:00), Max: 100.6 (13 Jun 2020 06:00)  HR: 94 (13 Jun 2020 06:00) (73 - 94)  BP: 129/67 (13 Jun 2020 06:00) (100/62 - 129/67)  BP(mean): --  RR: 18 (13 Jun 2020 06:00) (16 - 18)  SpO2: 98% (13 Jun 2020 06:00) (96% - 100%)    General: NAD. AAOx3  Abd: soft. NT/ND  : Roger with clear yellow urine                          11.8   9.80  )-----------( 159      ( 12 Jun 2020 05:29 )             34.9     06-12    141  |  107  |  9   ----------------------------<  94  4.2   |  27  |  0.91    Ca    8.5      12 Jun 2020 05:29

## 2020-06-13 NOTE — PROGRESS NOTE ADULT - ASSESSMENT
Very pleasant 69 year old gentleman with sepsis secondary to UTI  -Continue antibiotics  -repeat Blood cultures pending  -appreciate ID input  -D/C with Roger catheter

## 2020-06-14 ENCOUNTER — TRANSCRIPTION ENCOUNTER (OUTPATIENT)
Age: 70
End: 2020-06-14

## 2020-06-14 VITALS
TEMPERATURE: 99 F | DIASTOLIC BLOOD PRESSURE: 54 MMHG | RESPIRATION RATE: 16 BRPM | SYSTOLIC BLOOD PRESSURE: 105 MMHG | HEART RATE: 73 BPM | OXYGEN SATURATION: 99 %

## 2020-06-14 PROCEDURE — 86900 BLOOD TYPING SEROLOGIC ABO: CPT

## 2020-06-14 PROCEDURE — 87635 SARS-COV-2 COVID-19 AMP PRB: CPT

## 2020-06-14 PROCEDURE — 85610 PROTHROMBIN TIME: CPT

## 2020-06-14 PROCEDURE — 99232 SBSQ HOSP IP/OBS MODERATE 35: CPT

## 2020-06-14 PROCEDURE — 80048 BASIC METABOLIC PNL TOTAL CA: CPT

## 2020-06-14 PROCEDURE — 36415 COLL VENOUS BLD VENIPUNCTURE: CPT

## 2020-06-14 PROCEDURE — 74176 CT ABD & PELVIS W/O CONTRAST: CPT

## 2020-06-14 PROCEDURE — 99291 CRITICAL CARE FIRST HOUR: CPT | Mod: 25

## 2020-06-14 PROCEDURE — 81001 URINALYSIS AUTO W/SCOPE: CPT

## 2020-06-14 PROCEDURE — 83690 ASSAY OF LIPASE: CPT

## 2020-06-14 PROCEDURE — 85027 COMPLETE CBC AUTOMATED: CPT

## 2020-06-14 PROCEDURE — 86901 BLOOD TYPING SEROLOGIC RH(D): CPT

## 2020-06-14 PROCEDURE — 93005 ELECTROCARDIOGRAM TRACING: CPT

## 2020-06-14 PROCEDURE — 87086 URINE CULTURE/COLONY COUNT: CPT

## 2020-06-14 PROCEDURE — 85730 THROMBOPLASTIN TIME PARTIAL: CPT

## 2020-06-14 PROCEDURE — 80053 COMPREHEN METABOLIC PANEL: CPT

## 2020-06-14 PROCEDURE — 83605 ASSAY OF LACTIC ACID: CPT

## 2020-06-14 PROCEDURE — 87150 DNA/RNA AMPLIFIED PROBE: CPT

## 2020-06-14 PROCEDURE — 87040 BLOOD CULTURE FOR BACTERIA: CPT

## 2020-06-14 PROCEDURE — 87186 SC STD MICRODIL/AGAR DIL: CPT

## 2020-06-14 PROCEDURE — 86850 RBC ANTIBODY SCREEN: CPT

## 2020-06-14 RX ORDER — MOXIFLOXACIN HYDROCHLORIDE TABLETS, 400 MG 400 MG/1
1 TABLET, FILM COATED ORAL
Qty: 14 | Refills: 0
Start: 2020-06-14 | End: 2020-06-20

## 2020-06-14 RX ORDER — MOXIFLOXACIN HYDROCHLORIDE TABLETS, 400 MG 400 MG/1
1 TABLET, FILM COATED ORAL
Qty: 20 | Refills: 0
Start: 2020-06-14 | End: 2020-06-23

## 2020-06-14 RX ADMIN — CEFEPIME 100 MILLIGRAM(S): 1 INJECTION, POWDER, FOR SOLUTION INTRAMUSCULAR; INTRAVENOUS at 06:07

## 2020-06-14 RX ADMIN — Medication 650 MILLIGRAM(S): at 03:34

## 2020-06-14 NOTE — PROGRESS NOTE ADULT - ASSESSMENT
Very pleasant 69 year old gentleman with sepsis secondary to UTI  -Continue antibiotics  -repeat Blood cultures pending  -UCx negative  -appreciate ID input  -D/C with Roger catheter  -Likely D/C home tomorrow

## 2020-06-14 NOTE — DISCHARGE NOTE PROVIDER - CARE PROVIDER_API CALL
Jerry Pool J  UROLOGY  [id number] [address] [place] [id number]  Phone: [phone]  Fax: [phone]  Follow Up Time: 1 week

## 2020-06-14 NOTE — DISCHARGE NOTE PROVIDER - NSDCFUSCHEDAPPT_GEN_ALL_CORE_FT
ABRAHAM ALCALA ; 06/16/2020 ; [facility] DisEmerg [address] [facility]  ABRAHAM ALCALA ; 06/18/2020 ; [facility] [address] [address]  ABRAHAM ALCALA ; 06/18/2020 ; [facility] PreAdmitABRAHAM Parmar ; 07/01/2020 ; [facility] ABRAHAM ALCALA ; 06/16/2020 ; [facility] DisEmerg [address] [facility]  ABRAHAM ALCALA ; 06/18/2020 ; [facility] [address] [address]  ABRAHAM ALCALA ; 06/18/2020 ; [facility] PreAdmitABRAHAM Parmar ; 07/01/2020 ; [facility]

## 2020-06-14 NOTE — DISCHARGE NOTE PROVIDER - HOSPITAL COURSE
70 y/o male with PMHx of BPH, urinary retention, urethral stricture, hematuria, kidney stone ,S/P appendectomy and TURP presents to the ED with complaints of worsening right-sided flank pain and suprapubic for the past 2-3 days. Admits irritation upon urination. Reports subjective fevers and chills yesterday. Denies chest pain, shortness of breath, abdominal pain, diarrhea, constipation, or any other acute complaints. Of note, patient was admitted at the end of May for right ureteral stone, prostatic urethral stricture s/p stricture dilation, right ureteral stent placement and he was scheduled for cytoscopy, transurethral resection of prostate, ureteroscopy, laser lithotripsy of stone with ureteral stent placement, however aborted due to ongoing infection. Patient was placed on IV cefepime, ID consulted. Culture grew multi-drug resistant pseudomonas, treated accordingly. Over the course of treatment, patient remained hemodynamically stable, afebrile, leukocytosis resolved. Patient is being discharged on oral Cipro as per ID recommendation, and will follow up with urologist as outpatient.

## 2020-06-14 NOTE — DISCHARGE NOTE PROVIDER - NSDCFUADDINST_GEN_ALL_CORE_FT
- Roger care per protocol  - Measure urine output daily  - Take antibiotics as instructed  - Continue taking flomax  - Follow up with Dr. Polo as directed, call to schedule/confirm an appointment

## 2020-06-14 NOTE — DISCHARGE NOTE NURSING/CASE MANAGEMENT/SOCIAL WORK - NSDPACMPNY_GEN_ALL_CORE
Pt calls asking for refills of Adderall 30 mg XR, & 10 mg BID.   Last fill 6/5/19    [facility]  
done  
Family

## 2020-06-14 NOTE — DISCHARGE NOTE PROVIDER - NSDCMRMEDTOKEN_GEN_ALL_CORE_FT
Cipro 500 mg oral tablet: 1 tab(s) orally 2 times a day MDD:2  Flomax 0.4 mg oral capsule: 1 cap(s) orally once a day (at bedtime)  ibuprofen 800 mg oral tablet: 1 tab(s) orally 3 times a day, As Needed

## 2020-06-14 NOTE — DISCHARGE NOTE NURSING/CASE MANAGEMENT/SOCIAL WORK - PATIENT PORTAL LINK FT
You can access the FollowMyHealth Patient Portal offered by [facility] by registering at the following website: [email or web address]. By joining [employer]’s FollowMyHealth portal, you will also be able to view your health information using other applications (apps) compatible with our system.

## 2020-06-14 NOTE — DISCHARGE NOTE PROVIDER - NSDCCPCAREPLAN_GEN_ALL_CORE_FT
PRINCIPAL DISCHARGE DIAGNOSIS  Diagnosis: UTI (urinary tract infection)  Assessment and Plan of Treatment:       SECONDARY DISCHARGE DIAGNOSES  Diagnosis: ENRIQUE (acute kidney injury)  Assessment and Plan of Treatment:     Diagnosis: Sepsis  Assessment and Plan of Treatment:

## 2020-06-14 NOTE — PROGRESS NOTE ADULT - SUBJECTIVE AND OBJECTIVE BOX
Patient seen/examined. Feels much better. Tmax 37.9 yesterday. No N/V. Repeat UCx negative, BCx pending      Vital Signs Last 24 Hrs  T(C): 37.3 (14 Jun 2020 04:53), Max: 37.9 (13 Jun 2020 14:42)  T(F): 99.2 (14 Jun 2020 04:53), Max: 100.3 (13 Jun 2020 14:42)  HR: 73 (14 Jun 2020 04:53) (73 - 95)  BP: 105/54 (14 Jun 2020 04:53) (84/46 - 122/64)  BP(mean): --  RR: 16 (14 Jun 2020 04:53) (15 - 16)  SpO2: 99% (14 Jun 2020 04:53) (97% - 99%)    General: NAD. AAOx3  Abd: soft. NT/ND  : Roger with clear yellow urine    Culture - Urine in AM (06.12.20 @ 14:13)    Specimen Source: .Urine Catheterized    Culture Results:   No growth    Culture - Blood in AM (06.12.20 @ 14:09)    Specimen Source: .Blood Blood-Peripheral    Culture Results:   No growth to date.

## 2020-06-15 LAB
CULTURE RESULTS: SIGNIFICANT CHANGE UP
SPECIMEN SOURCE: SIGNIFICANT CHANGE UP

## 2020-06-15 RX ORDER — CHLORHEXIDINE GLUCONATE 213 G/1000ML
1 SOLUTION TOPICAL
Qty: 1 | Refills: 0
Start: 2020-06-15 | End: 2020-06-15

## 2020-06-16 ENCOUNTER — APPOINTMENT (OUTPATIENT)
Dept: DISASTER EMERGENCY | Facility: CLINIC | Age: 70
End: 2020-06-16

## 2020-06-16 DIAGNOSIS — Z01.818 ENCOUNTER FOR OTHER PREPROCEDURAL EXAMINATION: ICD-10-CM

## 2020-06-17 ENCOUNTER — TRANSCRIPTION ENCOUNTER (OUTPATIENT)
Age: 70
End: 2020-06-17

## 2020-06-17 LAB
CULTURE RESULTS: SIGNIFICANT CHANGE UP
SARS-COV-2 N GENE NPH QL NAA+PROBE: NOT DETECTED
SPECIMEN SOURCE: SIGNIFICANT CHANGE UP

## 2020-06-18 ENCOUNTER — RESULT REVIEW (OUTPATIENT)
Age: 70
End: 2020-06-18

## 2020-06-18 ENCOUNTER — APPOINTMENT (OUTPATIENT)
Dept: UROLOGY | Facility: HOSPITAL | Age: 70
End: 2020-06-18

## 2020-06-18 ENCOUNTER — INPATIENT (INPATIENT)
Facility: HOSPITAL | Age: 70
LOS: 0 days | Discharge: HOME | DRG: 713 | End: 2020-06-19
Attending: UROLOGY | Admitting: UROLOGY
Payer: COMMERCIAL

## 2020-06-18 VITALS
RESPIRATION RATE: 14 BRPM | HEART RATE: 86 BPM | HEIGHT: 71 IN | TEMPERATURE: 98 F | OXYGEN SATURATION: 100 % | WEIGHT: 160.06 LBS | DIASTOLIC BLOOD PRESSURE: 72 MMHG | SYSTOLIC BLOOD PRESSURE: 115 MMHG

## 2020-06-18 DIAGNOSIS — Z90.79 ACQUIRED ABSENCE OF OTHER GENITAL ORGAN(S): Chronic | ICD-10-CM

## 2020-06-18 DIAGNOSIS — N40.1 BENIGN PROSTATIC HYPERPLASIA WITH LOWER URINARY TRACT SYMPTOMS: ICD-10-CM

## 2020-06-18 DIAGNOSIS — Z90.49 ACQUIRED ABSENCE OF OTHER SPECIFIED PARTS OF DIGESTIVE TRACT: Chronic | ICD-10-CM

## 2020-06-18 DIAGNOSIS — N20.0 CALCULUS OF KIDNEY: Chronic | ICD-10-CM

## 2020-06-18 DIAGNOSIS — N13.8 OTHER OBSTRUCTIVE AND REFLUX UROPATHY: ICD-10-CM

## 2020-06-18 DIAGNOSIS — N20.1 CALCULUS OF URETER: ICD-10-CM

## 2020-06-18 LAB — BLD GP AB SCN SERPL QL: SIGNIFICANT CHANGE UP

## 2020-06-18 PROCEDURE — 88300 SURGICAL PATH GROSS: CPT | Mod: 26,59

## 2020-06-18 PROCEDURE — 88305 TISSUE EXAM BY PATHOLOGIST: CPT | Mod: 26,59

## 2020-06-18 PROCEDURE — 74420 UROGRAPHY RTRGR +-KUB: CPT | Mod: 26

## 2020-06-18 RX ORDER — HYDROMORPHONE HYDROCHLORIDE 2 MG/ML
1 INJECTION INTRAMUSCULAR; INTRAVENOUS; SUBCUTANEOUS
Refills: 0 | Status: DISCONTINUED | OUTPATIENT
Start: 2020-06-18 | End: 2020-06-18

## 2020-06-18 RX ORDER — SODIUM CHLORIDE 9 MG/ML
1000 INJECTION, SOLUTION INTRAVENOUS
Refills: 0 | Status: DISCONTINUED | OUTPATIENT
Start: 2020-06-18 | End: 2020-06-18

## 2020-06-18 RX ORDER — CEFEPIME 1 G/1
1000 INJECTION, POWDER, FOR SOLUTION INTRAMUSCULAR; INTRAVENOUS EVERY 12 HOURS
Refills: 0 | Status: DISCONTINUED | OUTPATIENT
Start: 2020-06-18 | End: 2020-06-19

## 2020-06-18 RX ORDER — SODIUM CHLORIDE 9 MG/ML
1000 INJECTION, SOLUTION INTRAVENOUS
Refills: 0 | Status: DISCONTINUED | OUTPATIENT
Start: 2020-06-18 | End: 2020-06-19

## 2020-06-18 RX ORDER — HYDROMORPHONE HYDROCHLORIDE 2 MG/ML
0.5 INJECTION INTRAMUSCULAR; INTRAVENOUS; SUBCUTANEOUS
Refills: 0 | Status: DISCONTINUED | OUTPATIENT
Start: 2020-06-18 | End: 2020-06-18

## 2020-06-18 RX ORDER — IBUPROFEN 200 MG
1 TABLET ORAL
Qty: 0 | Refills: 0 | DISCHARGE

## 2020-06-18 RX ORDER — ACETAMINOPHEN 500 MG
650 TABLET ORAL EVERY 6 HOURS
Refills: 0 | Status: DISCONTINUED | OUTPATIENT
Start: 2020-06-18 | End: 2020-06-19

## 2020-06-18 RX ORDER — SODIUM CHLORIDE 9 MG/ML
1000 INJECTION INTRAMUSCULAR; INTRAVENOUS; SUBCUTANEOUS
Refills: 0 | Status: DISCONTINUED | OUTPATIENT
Start: 2020-06-18 | End: 2020-06-19

## 2020-06-18 RX ORDER — ACETAMINOPHEN 500 MG
1000 TABLET ORAL ONCE
Refills: 0 | Status: DISCONTINUED | OUTPATIENT
Start: 2020-06-18 | End: 2020-06-18

## 2020-06-18 RX ORDER — FENTANYL CITRATE 50 UG/ML
25 INJECTION INTRAVENOUS
Refills: 0 | Status: DISCONTINUED | OUTPATIENT
Start: 2020-06-18 | End: 2020-06-18

## 2020-06-18 RX ORDER — SODIUM CHLORIDE 9 MG/ML
3 INJECTION INTRAMUSCULAR; INTRAVENOUS; SUBCUTANEOUS EVERY 8 HOURS
Refills: 0 | Status: DISCONTINUED | OUTPATIENT
Start: 2020-06-18 | End: 2020-06-18

## 2020-06-18 RX ORDER — ONDANSETRON 8 MG/1
4 TABLET, FILM COATED ORAL ONCE
Refills: 0 | Status: DISCONTINUED | OUTPATIENT
Start: 2020-06-18 | End: 2020-06-19

## 2020-06-18 RX ORDER — OXYCODONE AND ACETAMINOPHEN 5; 325 MG/1; MG/1
1 TABLET ORAL EVERY 4 HOURS
Refills: 0 | Status: DISCONTINUED | OUTPATIENT
Start: 2020-06-18 | End: 2020-06-19

## 2020-06-18 RX ORDER — FENTANYL CITRATE 50 UG/ML
50 INJECTION INTRAVENOUS
Refills: 0 | Status: DISCONTINUED | OUTPATIENT
Start: 2020-06-18 | End: 2020-06-18

## 2020-06-18 RX ADMIN — OXYCODONE AND ACETAMINOPHEN 1 TABLET(S): 5; 325 TABLET ORAL at 16:00

## 2020-06-18 RX ADMIN — FENTANYL CITRATE 50 MICROGRAM(S): 50 INJECTION INTRAVENOUS at 11:28

## 2020-06-18 RX ADMIN — SODIUM CHLORIDE 75 MILLILITER(S): 9 INJECTION INTRAMUSCULAR; INTRAVENOUS; SUBCUTANEOUS at 17:39

## 2020-06-18 RX ADMIN — CEFEPIME 100 MILLIGRAM(S): 1 INJECTION, POWDER, FOR SOLUTION INTRAMUSCULAR; INTRAVENOUS at 19:50

## 2020-06-18 NOTE — BRIEF OPERATIVE NOTE - NSICDXBRIEFPOSTOP_GEN_ALL_CORE_FT
POST-OP DIAGNOSIS:  BPH with urinary obstruction 18-Jun-2020 11:07:32  Ayaka Rios  Right ureteral stone 18-Jun-2020 11:07:36  Ayaka Rios

## 2020-06-18 NOTE — ASU PATIENT PROFILE, ADULT - PATIENT REPRESENTATIVE: ( YOU CAN CHOOSE ANY PERSON THAT CAN ASSIST YOU WITH YOUR HEALTH CARE PREFERENCES, DOES NOT HAVE TO BE A SPOUSE, IMMEDIATE FAMILY OR SIGNIFICANT OTHER/PARTNER)
Declines Consent 3/Introductory Paragraph: I gave the patient a chance to ask questions they had about the procedure.  Following this I explained the Mohs procedure and consent was obtained. The risks, benefits and alternatives to therapy were discussed in detail. Specifically, the risks of infection, scarring, bleeding, prolonged wound healing, incomplete removal, allergy to anesthesia, nerve injury and recurrence were addressed. Prior to the procedure, the treatment site was clearly identified and confirmed by the patient. All components of Universal Protocol/PAUSE Rule completed.

## 2020-06-18 NOTE — BRIEF OPERATIVE NOTE - NSICDXBRIEFPROCEDURE_GEN_ALL_CORE_FT
PROCEDURES:  Cystoscopy, with TURP 18-Jun-2020 11:06:05  Ayaka Rios  Cystoscopy, w retrograde pyelogram, ureteroscopy, urinary calculus laser lithotripsy, + stent insert 18-Jun-2020 11:03:50  Ayaka Rios

## 2020-06-18 NOTE — PROGRESS NOTE ADULT - SUBJECTIVE AND OBJECTIVE BOX
INTERVAL HPI/OVERNIGHT EVENTS:  Pt resting comfortably. No acute complaints.   CBI running at slow rate.   Denies abd/back pain.    MEDICATIONS  (STANDING):  cefepime   IVPB 1000 milliGRAM(s) IV Intermittent every 12 hours  lactated ringers. 1000 milliLiter(s) (75 mL/Hr) IV Continuous <Continuous>  sodium chloride 0.9%. 1000 milliLiter(s) (75 mL/Hr) IV Continuous <Continuous>    MEDICATIONS  (PRN):  acetaminophen   Tablet .. 650 milliGRAM(s) Oral every 6 hours PRN Mild Pain (1 - 3)  HYDROmorphone  Injectable 0.5 milliGRAM(s) IV Push every 10 minutes PRN Moderate Pain (4 - 6)  HYDROmorphone  Injectable 1 milliGRAM(s) IV Push every 10 minutes PRN Severe Pain (7 - 10)  ondansetron Injectable 4 milliGRAM(s) IV Push once PRN Nausea and/or Vomiting  oxycodone    5 mG/acetaminophen 325 mG 1 Tablet(s) Oral every 4 hours PRN Moderate Pain (4 - 6)      Vital Signs Last 24 Hrs  T(C): 36.8 (18 Jun 2020 16:33), Max: 36.9 (18 Jun 2020 07:41)  T(F): 98.3 (18 Jun 2020 16:33), Max: 98.4 (18 Jun 2020 07:41)  HR: 56 (18 Jun 2020 16:33) (56 - 86)  BP: 137/88 (18 Jun 2020 16:33) (115/72 - 146/74)  BP(mean): 98 (18 Jun 2020 13:15) (87 - 98)  RR: 16 (18 Jun 2020 16:33) (12 - 18)  SpO2: 100% (18 Jun 2020 16:33) (93% - 100%)    Physical:  General: A&Ox3. NAD.  Abdomen: Soft nondistended, no suprapubic tenderness.  Back: No CVAT b/l    I&O's Detail    18 Jun 2020 07:01  -  18 Jun 2020 22:19  --------------------------------------------------------  IN:    Continuous Bladder Irrigation: 8400 mL    lactated ringers.: 1175 mL  Total IN: 9575 mL    OUT:    Continuous Bladder Irrigation: 58101 mL clear    Estimated Blood Loss: 50 mL  Total OUT: 55807 mL    Total NET: -5225 mL

## 2020-06-18 NOTE — BRIEF OPERATIVE NOTE - NSICDXBRIEFPREOP_GEN_ALL_CORE_FT
PRE-OP DIAGNOSIS:  BPH with urinary obstruction 18-Jun-2020 11:07:26  Ayaka Rios  Right ureteral stone 18-Jun-2020 11:06:22  Ayaka Rios

## 2020-06-18 NOTE — PROGRESS NOTE ADULT - ASSESSMENT
69y.o. Male s/p cysto, R ureteroscopy, laser lithotripsy    -Keep CBI at current rate tonight  -Plan to clamp CBI in AM  -Irrigate prn  -Diet as tolerated  -Pain control prn  -DVT ppx  -con't abx

## 2020-06-18 NOTE — BRIEF OPERATIVE NOTE - DISPOSITION
A/P     1. PAWAN on CKD(stage 3) :  - patient presented with elevated creatinine which improved s/p cystoscopy noted to have an elevated level likely from ischemic insult in setting of hypotension and from toradol  - avoid toradol  - renal us with no hydronephrosis  - Keep patient euvolemic   - Avoid Nephrotoxic Meds/ Agents such as (NSAIDs, IV contrast, Aminoglycosides such as gentamicin, -Gadolinium contrast, Phosphate containing enemas, etc..)  - Adjust Medications according to eGFR  - Monitor BMP daily     2. METABOLIC ACIDOSIS: secondary to CKD, normal AG   -continue nahco3 tab   -f/u CO2 daily    3. Hematuria: resolved  - s/p cystoscopy, biopsy s/o hemorrhagic cystitis  - h/o Prostate ca s/p radiation   - urology f/u   - avoid IV cont     4 HTN :   - BP acceptable   -cont with current meds   - keep BP < 130/80 A/P     1. PAWAN on CKD(stage 3) :  - patient presented with elevated creatinine which improved s/p cystoscopy noted to have an elevated level likely from ischemic insult in setting of hypotension and from toradol  - avoid toradol  - renal us with no hydronephrosis  - Keep patient euvolemic   - Avoid Nephrotoxic Meds/ Agents such as (NSAIDs, IV contrast, Aminoglycosides such as gentamicin, -Gadolinium contrast, Phosphate containing enemas, etc..)  - Adjust Medications according to eGFR  - Monitor BMP daily     2. METABOLIC ACIDOSIS: secondary to CKD, normal AG   -suggest to get ABG  -continue nahco3 tab   -f/u CO2 daily    3. Hematuria: resolved  - s/p cystoscopy, biopsy s/o hemorrhagic cystitis  - h/o Prostate ca s/p radiation   - urology f/u   - avoid IV cont     4 HTN :   - BP acceptable   -cont with current meds   - keep BP < 130/80 A/P     1. PAWAN on CKD(stage 3) : creatinine at baseline today  - patient presented with elevated creatinine which improved s/p cystoscopy noted to have an elevated level likely from ischemic insult in setting of hypotension and from toradol  - avoid toradol  - renal us with no hydronephrosis  - Keep patient euvolemic   - Avoid Nephrotoxic Meds/ Agents such as (NSAIDs, IV contrast, Aminoglycosides such as gentamicin, -Gadolinium contrast, Phosphate containing enemas, etc..)  - Adjust Medications according to eGFR  - Monitor BMP daily     2. METABOLIC ACIDOSIS: secondary to CKD, normal AG   -suggest to get ABG  -continue nahco3 tab   -f/u CO2 daily    3. Hematuria: resolved  - s/p cystoscopy, biopsy s/o hemorrhagic cystitis  - h/o Prostate ca s/p radiation   - urology f/u   - avoid IV cont     4 HTN :   - BP acceptable   -cont with current meds   - keep BP < 130/80 A/P     1. PAWAN on CKD(stage 3) : creatinine near baseline today  - patient presented with elevated creatinine which improved s/p cystoscopy noted to have an elevated level likely from ischemic insult in setting of hypotension and from toradol  - avoid toradol  - renal us with no hydronephrosis  - Keep patient euvolemic   - Avoid Nephrotoxic Meds/ Agents such as (NSAIDs, IV contrast, Aminoglycosides such as gentamicin, -Gadolinium contrast, Phosphate containing enemas, etc..)  - Adjust Medications according to eGFR  - Monitor BMP daily   -pt needs renal f/u with Dr Stanley Orr    2. METABOLIC ACIDOSIS: secondary to CKD, normal AG , now better today  -continue nahco3 tab   -f/u CO2 daily    3. Hematuria: resolved  - s/p cystoscopy, biopsy s/o hemorrhagic cystitis  - h/o Prostate ca s/p radiation   - urology f/u   - avoid IV cont     4 HTN :   - BP acceptable   -cont with current meds   - keep BP < 130/80 PACU

## 2020-06-18 NOTE — BRIEF OPERATIVE NOTE - OPERATION/FINDINGS
Stricture from previous BPH, s/p transurethral resection  R ureteral stone s/p lithotripsy; ureteral stent placed  3w 22fr placed, on CBI

## 2020-06-19 ENCOUNTER — TRANSCRIPTION ENCOUNTER (OUTPATIENT)
Age: 70
End: 2020-06-19

## 2020-06-19 VITALS
RESPIRATION RATE: 18 BRPM | HEART RATE: 78 BPM | OXYGEN SATURATION: 99 % | SYSTOLIC BLOOD PRESSURE: 111 MMHG | DIASTOLIC BLOOD PRESSURE: 61 MMHG | TEMPERATURE: 99 F

## 2020-06-19 PROBLEM — Z87.440 PERSONAL HISTORY OF URINARY (TRACT) INFECTIONS: Chronic | Status: ACTIVE | Noted: 2020-06-18

## 2020-06-19 PROBLEM — R59.0 LOCALIZED ENLARGED LYMPH NODES: Chronic | Status: ACTIVE | Noted: 2020-06-18

## 2020-06-19 LAB
ANION GAP SERPL CALC-SCNC: 5 MMOL/L — SIGNIFICANT CHANGE UP (ref 5–17)
BASOPHILS # BLD AUTO: 0.05 K/UL — SIGNIFICANT CHANGE UP (ref 0–0.2)
BASOPHILS NFR BLD AUTO: 0.4 % — SIGNIFICANT CHANGE UP (ref 0–2)
BUN SERPL-MCNC: 14 MG/DL — SIGNIFICANT CHANGE UP (ref 7–18)
CALCIUM SERPL-MCNC: 8.1 MG/DL — LOW (ref 8.4–10.5)
CHLORIDE SERPL-SCNC: 106 MMOL/L — SIGNIFICANT CHANGE UP (ref 96–108)
CO2 SERPL-SCNC: 28 MMOL/L — SIGNIFICANT CHANGE UP (ref 22–31)
CREAT SERPL-MCNC: 1.07 MG/DL — SIGNIFICANT CHANGE UP (ref 0.5–1.3)
EOSINOPHIL # BLD AUTO: 0.12 K/UL — SIGNIFICANT CHANGE UP (ref 0–0.5)
EOSINOPHIL NFR BLD AUTO: 1 % — SIGNIFICANT CHANGE UP (ref 0–6)
GLUCOSE SERPL-MCNC: 100 MG/DL — HIGH (ref 70–99)
HCT VFR BLD CALC: 33.4 % — LOW (ref 39–50)
HGB BLD-MCNC: 11.4 G/DL — LOW (ref 13–17)
IMM GRANULOCYTES NFR BLD AUTO: 0.3 % — SIGNIFICANT CHANGE UP (ref 0–1.5)
LYMPHOCYTES # BLD AUTO: 1.94 K/UL — SIGNIFICANT CHANGE UP (ref 1–3.3)
LYMPHOCYTES # BLD AUTO: 16.1 % — SIGNIFICANT CHANGE UP (ref 13–44)
MCHC RBC-ENTMCNC: 31.3 PG — SIGNIFICANT CHANGE UP (ref 27–34)
MCHC RBC-ENTMCNC: 34.1 GM/DL — SIGNIFICANT CHANGE UP (ref 32–36)
MCV RBC AUTO: 91.8 FL — SIGNIFICANT CHANGE UP (ref 80–100)
MONOCYTES # BLD AUTO: 1.09 K/UL — HIGH (ref 0–0.9)
MONOCYTES NFR BLD AUTO: 9 % — SIGNIFICANT CHANGE UP (ref 2–14)
NEUTROPHILS # BLD AUTO: 8.81 K/UL — HIGH (ref 1.8–7.4)
NEUTROPHILS NFR BLD AUTO: 73.2 % — SIGNIFICANT CHANGE UP (ref 43–77)
NRBC # BLD: 0 /100 WBCS — SIGNIFICANT CHANGE UP (ref 0–0)
PLATELET # BLD AUTO: 394 K/UL — SIGNIFICANT CHANGE UP (ref 150–400)
POTASSIUM SERPL-MCNC: 4.2 MMOL/L — SIGNIFICANT CHANGE UP (ref 3.5–5.3)
POTASSIUM SERPL-SCNC: 4.2 MMOL/L — SIGNIFICANT CHANGE UP (ref 3.5–5.3)
RBC # BLD: 3.64 M/UL — LOW (ref 4.2–5.8)
RBC # FLD: 13.9 % — SIGNIFICANT CHANGE UP (ref 10.3–14.5)
SODIUM SERPL-SCNC: 139 MMOL/L — SIGNIFICANT CHANGE UP (ref 135–145)
WBC # BLD: 12.05 K/UL — HIGH (ref 3.8–10.5)
WBC # FLD AUTO: 12.05 K/UL — HIGH (ref 3.8–10.5)

## 2020-06-19 PROCEDURE — 80048 BASIC METABOLIC PNL TOTAL CA: CPT

## 2020-06-19 PROCEDURE — 86923 COMPATIBILITY TEST ELECTRIC: CPT

## 2020-06-19 PROCEDURE — C1889: CPT

## 2020-06-19 PROCEDURE — 88300 SURGICAL PATH GROSS: CPT

## 2020-06-19 PROCEDURE — 86901 BLOOD TYPING SEROLOGIC RH(D): CPT

## 2020-06-19 PROCEDURE — 86900 BLOOD TYPING SEROLOGIC ABO: CPT

## 2020-06-19 PROCEDURE — C1769: CPT

## 2020-06-19 PROCEDURE — 88305 TISSUE EXAM BY PATHOLOGIST: CPT

## 2020-06-19 PROCEDURE — 86850 RBC ANTIBODY SCREEN: CPT

## 2020-06-19 PROCEDURE — C2617: CPT

## 2020-06-19 PROCEDURE — 82365 CALCULUS SPECTROSCOPY: CPT

## 2020-06-19 PROCEDURE — 76000 FLUOROSCOPY <1 HR PHYS/QHP: CPT

## 2020-06-19 PROCEDURE — 85027 COMPLETE CBC AUTOMATED: CPT

## 2020-06-19 PROCEDURE — 36415 COLL VENOUS BLD VENIPUNCTURE: CPT

## 2020-06-19 RX ORDER — MOXIFLOXACIN HYDROCHLORIDE TABLETS, 400 MG 400 MG/1
1 TABLET, FILM COATED ORAL
Qty: 10 | Refills: 0
Start: 2020-06-19 | End: 2020-06-23

## 2020-06-19 RX ORDER — TRAMADOL HYDROCHLORIDE 50 MG/1
1 TABLET ORAL
Qty: 12 | Refills: 0
Start: 2020-06-19 | End: 2020-06-21

## 2020-06-19 RX ADMIN — CEFEPIME 100 MILLIGRAM(S): 1 INJECTION, POWDER, FOR SOLUTION INTRAMUSCULAR; INTRAVENOUS at 09:45

## 2020-06-19 NOTE — PROGRESS NOTE ADULT - ASSESSMENT
Very pleasant 69 year old gentleman with BPH s/p TURP, right ureteral stone s/p right ureteroscopy, laser lithotripsy, stone extraction, stent exchange  -Clamp CBI  -Trial of void  -labs reviewed  -discussed with PA staff  -F/U in 1 week for stent removal in the office  -D/C home today with antibiotics

## 2020-06-19 NOTE — DISCHARGE NOTE PROVIDER - NSDCMRMEDTOKEN_GEN_ALL_CORE_FT
Cipro 500 mg oral tablet: 1 tab(s) orally every 12 hours   Nadeen-Hex 4 topical liquid: Apply topically to affected area once   Flomax 0.4 mg oral capsule: 1 cap(s) orally once a day (at bedtime)  ibuprofen 800 mg oral tablet: 1 tab(s) orally 3 times a day, As Needed  Ultram 50 mg oral tablet: 1 tab(s) orally every 6 hours MDD:4 tabs

## 2020-06-19 NOTE — DISCHARGE NOTE PROVIDER - NSDCCPCAREPLAN_GEN_ALL_CORE_FT
PRINCIPAL DISCHARGE DIAGNOSIS  Diagnosis: Renal calculi  Assessment and Plan of Treatment: s/p cystoscopy Lithotripsy and R stent placement  Please follow up with Dr. Polo within 1 week   Continue antibiotics for 5 additional days   Diet as tolerated

## 2020-06-19 NOTE — DISCHARGE NOTE PROVIDER - HOSPITAL COURSE
69y.o. Male  diagnosed with calculus of ureter, enlarged prostate with lower urinary tract symptoms, s/p cysto, R ureteroscopy, laser lithotripsy. Patient tolerated procedure well. CBI clamped, hall removed. patient voided. Patient for d/c home with outpatient follow up with Dr. Polo 69y.o. Male  diagnosed with calculus of ureter, enlarged prostate with lower urinary tract symptoms, s/p cysto, R ureteroscopy, laser lithotripsy. Patient tolerated procedure well. CBI clamped and urine remained clear with very light pink appearance. patient very reluctant for hall removal despite reassurance from attending that tov recommended. Patient hall placed to leg bag and plan for d/c home with outpatient follow up with Dr. Polo.

## 2020-06-19 NOTE — DISCHARGE NOTE PROVIDER - NSDCFUADDAPPT_GEN_ALL_CORE_FT
Follow up with Dr Polo on TueSDAY for hall cath removal. At that time Dr Polo will inform you if you need to see him again on Friday as well.

## 2020-06-19 NOTE — DISCHARGE NOTE PROVIDER - CARE PROVIDER_API CALL
Jerry Polo J  UROLOGY  [id number] [address] [place] [id number]  Phone: [phone]  Fax: [phone]  Follow Up Time: 1 week

## 2020-06-19 NOTE — DISCHARGE NOTE PROVIDER - NSDCFUSCHEDAPPT_GEN_ALL_CORE_FT
ABRAHAM ALCALA ; 07/01/2020 ; [place] [facility] ABRAHAM ALCALA ; 07/01/2020 ; [facility]

## 2020-06-19 NOTE — PROGRESS NOTE ADULT - SUBJECTIVE AND OBJECTIVE BOX
Patient seen/examined. Feels well. No events overnight. Afebrile. Urine light pink on slow CBI    Vital Signs Last 24 Hrs  T(C): 37.3 (19 Jun 2020 06:06), Max: 37.3 (18 Jun 2020 22:27)  T(F): 99.1 (19 Jun 2020 06:06), Max: 99.1 (18 Jun 2020 22:27)  HR: 79 (19 Jun 2020 06:06) (56 - 85)  BP: 117/69 (19 Jun 2020 06:06) (109/63 - 146/74)  BP(mean): 98 (18 Jun 2020 13:15) (87 - 98)  RR: 16 (19 Jun 2020 06:06) (12 - 18)  SpO2: 98% (19 Jun 2020 06:06) (93% - 100%)    General: NAD. AAOx3  Abd: soft. NT/ND  : Roger with light pink urine                          11.4   12.05 )-----------( 394      ( 19 Jun 2020 07:03 )             33.4     06-19    139  |  106  |  14  ----------------------------<  100<H>  4.2   |  28  |  1.07    Ca    8.1<L>      19 Jun 2020 07:03

## 2020-06-19 NOTE — DISCHARGE NOTE NURSING/CASE MANAGEMENT/SOCIAL WORK - PATIENT PORTAL LINK FT
You can access the FollowMyHealth Patient Portal offered by [facility] by registering at the following website: [email or web address]. By joining [employer]’s FollowMyHealth portal, you will also be able to view your health information using other applications (apps) compatible with our system.

## 2020-06-19 NOTE — DISCHARGE NOTE PROVIDER - NSDCCPTREATMENT_GEN_ALL_CORE_FT
PRINCIPAL PROCEDURE  Procedure: Cystoscopy, w retrograde pyelogram, ureteroscopy, urinary calculus laser lithotripsy, + stent insert  Findings and Treatment:

## 2020-06-23 ENCOUNTER — APPOINTMENT (OUTPATIENT)
Dept: UROLOGY | Facility: CLINIC | Age: 70
End: 2020-06-23
Payer: MEDICAID

## 2020-06-23 DIAGNOSIS — N40.1 BENIGN PROSTATIC HYPERPLASIA WITH LOWER URINARY TRACT SYMPMS: ICD-10-CM

## 2020-06-23 DIAGNOSIS — N13.8 BENIGN PROSTATIC HYPERPLASIA WITH LOWER URINARY TRACT SYMPMS: ICD-10-CM

## 2020-06-23 PROCEDURE — 51700 IRRIGATION OF BLADDER: CPT | Mod: 58

## 2020-06-24 LAB — NIDUS STONE QN: SIGNIFICANT CHANGE UP

## 2020-06-26 ENCOUNTER — APPOINTMENT (OUTPATIENT)
Dept: UROLOGY | Facility: CLINIC | Age: 70
End: 2020-06-26
Payer: MEDICARE

## 2020-06-26 VITALS — DIASTOLIC BLOOD PRESSURE: 66 MMHG | HEART RATE: 78 BPM | SYSTOLIC BLOOD PRESSURE: 106 MMHG

## 2020-06-26 DIAGNOSIS — N20.1 CALCULUS OF URETER: ICD-10-CM

## 2020-06-26 PROCEDURE — 52315 CYSTOSCOPY AND TREATMENT: CPT | Mod: 78

## 2020-06-26 RX ORDER — CIPROFLOXACIN HYDROCHLORIDE 500 MG/1
500 TABLET, FILM COATED ORAL
Qty: 10 | Refills: 0 | Status: ACTIVE | COMMUNITY
Start: 2020-06-20

## 2020-06-26 RX ORDER — TAMSULOSIN HYDROCHLORIDE 0.4 MG/1
0.4 CAPSULE ORAL
Qty: 14 | Refills: 0 | Status: ACTIVE | COMMUNITY
Start: 2020-06-22

## 2020-06-26 RX ORDER — TRAMADOL HYDROCHLORIDE 50 MG/1
50 TABLET, COATED ORAL
Qty: 12 | Refills: 0 | Status: ACTIVE | COMMUNITY
Start: 2020-06-20

## 2020-07-31 ENCOUNTER — APPOINTMENT (OUTPATIENT)
Dept: UROLOGY | Facility: CLINIC | Age: 70
End: 2020-07-31

## 2021-06-17 NOTE — ED ADULT NURSE NOTE - NS ED NURSE PATIENT LEFT UNIT TIME
Weekly Progress Note  Patient Name: Tyler Ramsey   : 1988   MRN: [id number]    D) Patient attended 2/2 groups this week with no absence. Patient had no individual sessions this week. A) Staff facilitated groups and reviewed tx progress. Assessed for [facility]. R) No VAP needed at this time.   Any significant events, defines as events that impact patient s relationship with others inside and outside of treatment: no   Indicate any changes or monitoring of physical or mental health problems: no     Indicate involvement by any outside supports: 12 Step meeting at [facility] reviewed and modified as needed. N/A   Pt working on the following dimensions:  Dimension #1 - Withdrawal Potential - Risk 0.  Last use of marijuana reported s 2/3/2018.  UAs on 4/3 and  were clean this week.   Pt reports no concern for withdrawal this week.    Specific goals from treatment plan addressed this week: maintain sobriety.   Effectiveness of strategies: Effective. Patient is maintaining sobriety as evidenced by UA results.      Dimension #2 - Biomedical - Risk 0. No medical issues reported by patient, however patient admits he is still smokes 3-4 cigars ad week.  Pt reports he is working toward reducing cigar use.    Specific goals from treatment plan addressed this week: Pt reports he goes to [employer] to exercise.  Primary care established at [facility] with Dr. Roberto Gunter.    Effectiveness of strategies: Effective.  Patient has health insurance to access medical care if necessary.  Patient is not reporting any major medical issues at this time.      Dimension #3 - Emotional/Behavioral/Cognitive - Risk 2.  The patient has no formal diagnoses of mental illnesses.  Patient participates well in group despite his difficulty with reading. Pt seems procrastinating on getting the reading assessment done at [facility]. This week, patient said once again that he will get it done next Monday.  Since the intake,  it is recommended to patient and he agreed in multiple occasions he needs to do it because he wants to improve his reading skills.    Specific goals from treatment plan addressed this week: Patient identifies one of his goal is to  his reading skills. Patient is recommended to get his reading assessment done at [facility].   Effectiveness of strategies:  Patient participates well in group but not following through with reading assessment recommendation     Dimension #4 - Treatment Acceptance/Resistance - Risk 1.  [facility] PO, Shayla Addison.    Specific goals from treatment plan addressed this week: Patient seems accepting his probation order and verbalizes his desire to quit.   Patient attended both groups this week and UAs are clean.    Effectiveness of strategies: Effective.  Patient was sober this week as evidenced by two UA results       Dimension #5 - Relapse Potential - Risk 2. Last use 2/3/18.  UAs were clean this week.  Patient has good insight into his addiction.  Patient has been taking about wanting to improve reading skills for weeks but has not initiated the reading assessment yet. Pt is able to participate verbally and do well.  Patient has been seen struggling to read sentences in group check in questions.  Patient seems he is procrastinating.      Specific goals from treatment plan addressed this week: Get reading assessment done at [facility] to get into a reading class.    Effectiveness of strategies: Patient attending group regularly but not initiating his reading assessment.      Dimension #6 - Recovery Environment - Risk 2.  Patient has stable housing and good family support.  Patient is on social security disability and receives $703/month. Pt lives in a subsidized apartment and works occasionally for landscape and construction.  The record shows patient goes to 12 Step meeting in [place]. Patient is single, no children and states he would  like to stay that way to focus on his own life for now. Pt states he has an extensive supportive family.  Patient reports his old using friends are accepting his sobriety and not bothering him.  Patient states he is telling everyone that he is no longer using and setting boundaries.    Specific goals from treatment plan addressed this week:  Develop and maintain sober support network.    Effectiveness of strategies: Patient seems he is learning healthy way to set boundaries with others and working on self care.       T) Treatment plan updated: N/A. Patient notified and in agreement: Yes   Patient educated on Healthy/Unhealty Thoughts.  Patient has completed 51 of 220 program hours at this time. Projected discharge date is 5/8/2018. Current discharge plan is TBD.      REG Nathan      Psycho-Educational Curriculum  Date Attended  Psycho-Educational Curriculum  Date Attended    Acceptance   Shame/Guilt     1st Step   Anger/Rage     Affirmations   Mental Health     Automatic Negative Thoughts   Anxiety     Cross Addiction  2/20, 2/22  Co-Occurring Disorders     Stages of Change   Carolyn/Bipolar     Relapse   Trauma   2/14, 2/15    Addictive Thoughts  3/13. 3/15  Victim Identity     Coping Skills   Sober Structure     Relapse Prevention   Continuum of Care     Medical Aspects   Non-12 Step Support     Brain/Neurotransmitters   Priorities     Medication Compliance   Spirituality  2/27, 3/1    ADELE Alcohol/Drug Research   Weekend Planner     Physical Health   Educational Videos     Post Acute Withdrawal   1st Step     Pregnancy and Drug Use   2nd Step     Sexual Health   Assertive Communication     Short-Term/Long-Term Effects   My name is Jun ARGUETAJodi Nicholson  3/29 Cross Addiction     Assertive Communication  3/20, 3/22,  God As We Understood Him     Boundaries  3/20, 3/22  HBO Relapse     Codependence   3/20, 3/22  HBO What Is Addiction     Defense Mechanisms  3/6/18, 3/8/18 Medical Aspects 1     Family  "Roles   Medical Aspects 2     Goodbye Letter   [employer] Geographic: Stress     Intimacy   PBS Depression Out of the Shadows     Needs/Dealbreakers in Relationships   The Anonymous People    Socialization Skills  2/8/18, 3/27/18 Eclectic     Feelings  4/3, 4/5  Julian Jean \"Highjacked Brain\"    ABC Model of Emotion   Rambo Stone Humor in [place]    Grief and Loss   The Mindfulness Movie    Healthy vs. Unhealthy Feelings   Jun GARCIA documentary     Meditation/Mindfulness       Overconfidence/Complacency       Resentments       Stress           " 02:23

## 2022-07-20 NOTE — PATIENT PROFILE ADULT - DISASTER - NSPRESCRALCSIXMORE_GEN_A_NUR
Provided report to RN at the returning assisted living facility in which no questions or concerns present. Adequate urine output present. [facility]ar arrived to assist pt. home.    Never

## 2023-02-27 NOTE — H&P PST ADULT - MEDICATION HERBAL REMEDIES, PROFILE
Cryotherapy Text: The wound bed was treated with cryotherapy after the biopsy was performed. in case of emergency call pt's son to make medical decisions/no

## 2023-09-28 NOTE — CONSULT NOTE ADULT - CARDIOVASCULAR
CHIEF COMPLAINT:   Chief Complaint   Patient presents with    9 months f/u w/ results      No cardiovascular complaints                                                  HPI:  Luis Branham 50 y.o. male with hypertension, heart failure with reduced ejection fraction( recovered EF 60% per ECHO 4.11.23), pulmonary sarcoidosis, and JEANETTE who presents for routine follow up and ongoing care. The patient completed an ECHO in April 2023 which revealed EF 60% which is improved from ECHO completed in November 2019 that revealed a left ventricular ejection fraction that measured approximately 55% which is improved from an EF of 40% per ECHO in Nov. 2018. (see full reports below). Patient underwent a Lexiscan Stress Test completed in 2019 revealed a small inferior fixed perfusion defect which likely represents previous scar, however attenuation artifact cannot be ruled out.  At his last office visit, patient denied any cardiac complaints at that time.  He stated that he was planned to undergo neck surgery on C2-C7.  At that time he was unsure the specifics regarding the surgery.    Today the patient states that he is feeling well overall.  He denies any cardiac complaints of chest pain, shortness of breath, palpitations, PND, orthopnea, claudication symptoms, lightheadedness, dizziness, or syncope.  He states that he is able to complete his ADLs without any issues or ischemic symptoms.  He states that he is able to complete his job duties working at a [employer] where house without any problems.  He states that he is fairly active in his day-to-day life and he does exercise at a gym a few times a week.  He reports compliance with all medications.  He denies any recent tobacco use.  He states that he has been trying to use CPAP more consistently and is benefitting from the use.                                                                                                                                                                                                                                                                                                                                                                                                                                                                                        CARDIAC TESTING:    Echo 4.11.23  · The estimated ejection fraction is 60%.  · Normal systolic function.  · Grade I left ventricular diastolic dysfunction.  · With normal right ventricular systolic function.    TTE 11.5.19  The ventricular ejection fraction is measured approximately 55%  Structurally normal mitral valve  Structurally normal aortic valve  Tricuspid valve is structurally normal  Trace tricuspid regurgitation  Pulmonic valve is structurally normal  Trace pulmonic regurgitation present  Normal-sized left atrium  Normal right atrial size  Right ventricular size with preserved RV function.    Lexiscan Stress Test February 2019:  Abnormal. There is a small inferior fixed perfusion defect which likely represents previous scar; However, attenuation artifact cannot be ruled out. There is mildly reduced global left ventricular systolic function  Recommendation:  Medical management.  Aggressive risk factor management    Echocardiogram November 2018:  Left ventricular size is normal.  Left ventricular ejection fraction is measured at approximately 40%.  Normal size right ventricle cavity.  Normal aortic valve structure and function.  Structurally normal mitral valve.  Trace mitral regurgitation.  There is trace tricuspid regurgitation with estimated RVSP of 13mm Hg.  No evidence of pericardial effusion.    Patient Active Problem List   Diagnosis    Erectile dysfunction    Allergic rhinitis    Degeneration of intervertebral disc of cervical region    Obstructive sleep apnea syndrome    Systolic heart failure    Hypertension    History of sarcoidosis    Need for vaccination    Wellness examination     Class 1 obesity due to excess calories with serious comorbidity and body mass index (BMI) of 30.0 to 30.9 in adult    Seasonal allergic rhinitis due to pollen    Gastroesophageal reflux disease without esophagitis    Pre-operative cardiovascular examination     History reviewed. No pertinent surgical history.  Social History     Socioeconomic History    Marital status: Single   Tobacco Use    Smoking status: Never     Passive exposure: Past    Smokeless tobacco: Never   Substance and Sexual Activity    Alcohol use: Yes     Alcohol/week: 2.0 standard drinks of alcohol     Types: 2 Shots of liquor per week     Comment: several times a week    Drug use: Never     Social Determinants of Health     Food Insecurity: No Food Insecurity (9/19/2023)    Hunger Vital Sign     Worried About Running Out of Food in the Last Year: Never true     Ran Out of Food in the Last Year: Never true   Transportation Needs: No Transportation Needs (9/15/2022)    PRAPARE - Transportation     Lack of Transportation (Medical): No     Lack of Transportation (Non-Medical): No   Social Connections: Unknown (9/19/2023)    Social Connection and Isolation Panel [NHANES]     Frequency of Communication with Friends and Family: More than three times a week     Frequency of Social Gatherings with Friends and Family: More than three times a week     Attends Protestant Services: More than 4 times per year     Active Member of Clubs or Organizations: No     Attends Club or Organization Meetings: Never     Marital Status: Patient refused   Housing Stability: Low Risk  (9/15/2022)    Housing Stability Vital Sign     Unable to Pay for Housing in the Last Year: No     Number of Places Lived in the Last Year: 1     Unstable Housing in the Last Year: No        Family History   Problem Relation Age of Onset    Stroke Mother      Review of patient's allergies indicates:   Allergen Reactions    Sulfamethoxazole-trimethoprim      Other reaction(s): blister of mouth     "     ROS:  Review of Systems   Constitutional: Negative.    HENT: Negative.     Eyes: Negative.    Respiratory: Negative.  Negative for shortness of breath.    Cardiovascular: Negative.  Negative for chest pain, palpitations, orthopnea, claudication, leg swelling and PND.   Gastrointestinal: Negative.    Genitourinary: Negative.    Musculoskeletal: Negative.    Skin: Negative.    Neurological: Negative.  Negative for dizziness and weakness.   Endo/Heme/Allergies: Negative.    Psychiatric/Behavioral: Negative.                                                                                                                                                                                  Negative except as stated in the history of present illness. See HPI for details.    PHYSICAL EXAM:  Visit Vitals  /79 (BP Location: Left arm, Patient Position: Sitting, BP Method: Large (Automatic))   Pulse 99   Temp 98.2 °F (36.8 °C) (Oral)   Resp 20   Ht 6' 2" (1.88 m)   Wt 106.1 kg (234 lb)   SpO2 100%   BMI 30.04 kg/m²       Physical Exam  Constitutional:       Appearance: Normal appearance.   HENT:      Head: Normocephalic.      Mouth/Throat:      Mouth: Mucous membranes are moist.   Eyes:      Extraocular Movements: Extraocular movements intact.      Pupils: Pupils are equal, round, and reactive to light.   Neck:      Vascular: No carotid bruit.   Cardiovascular:      Rate and Rhythm: Normal rate and regular rhythm.      Pulses: Normal pulses.      Heart sounds: Normal heart sounds.   Pulmonary:      Effort: Pulmonary effort is normal.      Breath sounds: Normal breath sounds.   Abdominal:      General: There is no distension.   Musculoskeletal:         General: Normal range of motion.      Right lower leg: No edema.      Left lower leg: No edema.   Skin:     General: Skin is warm and dry.   Neurological:      General: No focal deficit present.      Mental Status: He is alert and oriented to person, place, and time. "   Psychiatric:         Mood and Affect: Mood normal.         Behavior: Behavior normal.         Current Outpatient Medications   Medication Instructions    aspirin 81 mg, Oral, Daily    carvediloL (COREG) 12.5 mg, Oral, 2 times daily    cetirizine (ZYRTEC) 10 mg, Oral, Daily    cyclobenzaprine (FLEXERIL) 10 mg, Oral, 3 times daily PRN    esomeprazole (NEXIUM) 20 mg, Oral, Before breakfast    fluticasone propionate (FLONASE) 50 mcg, Each Nostril, Daily    gabapentin (NEURONTIN) 300 mg, Oral, 3 times daily    HYDROcodone-acetaminophen (NORCO)  mg per tablet 1 tablet, Oral, 4 times daily PRN    lisinopriL-hydrochlorothiazide (PRINZIDE,ZESTORETIC) 20-25 mg Tab 1 tablet, Oral, Daily    montelukast (SINGULAIR) 10 mg tablet TAKE 1 TABLET(10 MG) BY MOUTH EVERY EVENING    sildenafiL (VIAGRA) 100 mg, Oral, Daily PRN    zolpidem (AMBIEN) 10 mg, Oral, Nightly PRN        All medications, laboratory studies, cardiac diagnostic imaging reviewed.     Lab Results   Component Value Date    LDL 46.00 (L) 09/19/2023    LDL 64.00 09/15/2022    TRIG 128 09/19/2023    TRIG 73 09/15/2022    CREATININE 1.12 09/19/2023    K 3.3 (L) 09/19/2023        ASSESSMENT/PLAN:     HFrEF recovered, NYHA I  - Denies any cardiac complaints today of SOB, FARMER, or CP  - LVEF 60% per TTE 4.11.23 which is improved from LVEF -55% per TTE 11.5.19 --> EF -40% per TTE Nov. 2018 -->35% per TTE Aug. 2018  - Lexiscan stress test - There is a small inferior fixed perfusion defect which likely represents previous scar; However, attenuation artifact cannot be ruled out. (Feb. 2019)  - Grade I diastolic dysfunction noted on ECHO from April 2023, will focus on tight blood pressure control, BP at goal today and patient states that it is typically well controlled at home  - Euvolemic upon exam and warm   - Continue GDMT: Carvedilol, Lisinopril   - Counseled on low-sodium, heart healthy diet and exercise as tolerated    HTN (Hypertension)   - BP at goal today-  121/79  - Continue current regimen with Coreg to 12.5 mg BID and HCTZ/Lisinopril  - Counseled on low-sodium diet and exercise as tolerated    JEANETTE  - Reports compliance with CPAP, states that he is benefitting from use  - Explained to the patient the deleterious effects of untreated sleep apnea including but not limited to pulmonary HTN, systemic HTN and atrial arrhythmia and the importance of compliance with CPAP.      Follow up in cardiology clinic in 9 months or sooner if needed   Follow up with PCP as directed   details… detailed exam

## 2024-10-24 NOTE — PRE-OP CHECKLIST - LAST TOOK
[facility] Office Hours:    Monday-Thursday  7:30 AM-4:30 PM    Friday  9:00 AM-1:00 PM   
solids

## 2025-04-18 NOTE — H&P PST ADULT - LIVES WITH, PROFILE
